# Patient Record
Sex: FEMALE | Race: WHITE | NOT HISPANIC OR LATINO | Employment: FULL TIME | URBAN - METROPOLITAN AREA
[De-identification: names, ages, dates, MRNs, and addresses within clinical notes are randomized per-mention and may not be internally consistent; named-entity substitution may affect disease eponyms.]

---

## 2023-03-31 ENCOUNTER — TELEPHONE (OUTPATIENT)
Dept: PRIMARY CARE | Facility: CLINIC | Age: 28
End: 2023-03-31
Payer: COMMERCIAL

## 2023-03-31 DIAGNOSIS — R74.01 ELEVATED ALT MEASUREMENT: Primary | ICD-10-CM

## 2023-03-31 DIAGNOSIS — E55.9 VITAMIN D DEFICIENCY: ICD-10-CM

## 2023-03-31 LAB
ALANINE AMINOTRANSFERASE (SGPT) (U/L) IN SER/PLAS: 76 U/L (ref 7–45)
ALBUMIN (G/DL) IN SER/PLAS: 3.9 G/DL (ref 3.4–5)
ALKALINE PHOSPHATASE (U/L) IN SER/PLAS: 87 U/L (ref 33–110)
ANION GAP IN SER/PLAS: 13 MMOL/L (ref 10–20)
APPEARANCE, URINE: ABNORMAL
ASPARTATE AMINOTRANSFERASE (SGOT) (U/L) IN SER/PLAS: 38 U/L (ref 9–39)
BASOPHILS (10*3/UL) IN BLOOD BY AUTOMATED COUNT: 0.04 X10E9/L (ref 0–0.1)
BASOPHILS/100 LEUKOCYTES IN BLOOD BY AUTOMATED COUNT: 0.5 % (ref 0–2)
BILIRUBIN TOTAL (MG/DL) IN SER/PLAS: 0.3 MG/DL (ref 0–1.2)
BILIRUBIN, URINE: NEGATIVE
BLOOD, URINE: ABNORMAL
CALCIDIOL (25 OH VITAMIN D3) (NG/ML) IN SER/PLAS: 12 NG/ML
CALCIUM (MG/DL) IN SER/PLAS: 8.8 MG/DL (ref 8.6–10.3)
CARBON DIOXIDE, TOTAL (MMOL/L) IN SER/PLAS: 24 MMOL/L (ref 21–32)
CHLORIDE (MMOL/L) IN SER/PLAS: 104 MMOL/L (ref 98–107)
CHOLESTEROL (MG/DL) IN SER/PLAS: 217 MG/DL (ref 0–199)
CHOLESTEROL IN HDL (MG/DL) IN SER/PLAS: 47.2 MG/DL
CHOLESTEROL/HDL RATIO: 4.6
CHORIOGONADOTROPIN (MIU/ML) IN SER/PLAS: <2 MIU/ML
COLOR, URINE: YELLOW
CREATININE (MG/DL) IN SER/PLAS: 0.67 MG/DL (ref 0.5–1.05)
DEHYDROEPIANDROSTERONE SULFATE (DHEA-S) (UG/DL) IN SER/: 139 UG/DL (ref 65–395)
EOSINOPHILS (10*3/UL) IN BLOOD BY AUTOMATED COUNT: 0.31 X10E9/L (ref 0–0.7)
EOSINOPHILS/100 LEUKOCYTES IN BLOOD BY AUTOMATED COUNT: 4.2 % (ref 0–6)
ERYTHROCYTE DISTRIBUTION WIDTH (RATIO) BY AUTOMATED COUNT: 12 % (ref 11.5–14.5)
ERYTHROCYTE MEAN CORPUSCULAR HEMOGLOBIN CONCENTRATION (G/DL) BY AUTOMATED: 34.1 G/DL (ref 32–36)
ERYTHROCYTE MEAN CORPUSCULAR VOLUME (FL) BY AUTOMATED COUNT: 92 FL (ref 80–100)
ERYTHROCYTES (10*6/UL) IN BLOOD BY AUTOMATED COUNT: 4.63 X10E12/L (ref 4–5.2)
ESTIMATED AVERAGE GLUCOSE FOR HBA1C: 100 MG/DL
ESTRADIOL (PG/ML) IN SER/PLAS: 30 PG/ML
FOLLITROPIN (IU/L) IN SER/PLAS: 7.8 IU/L
GFR FEMALE: >90 ML/MIN/1.73M2
GLUCOSE (MG/DL) IN SER/PLAS: 85 MG/DL (ref 74–99)
GLUCOSE, URINE: NEGATIVE MG/DL
HEMATOCRIT (%) IN BLOOD BY AUTOMATED COUNT: 42.5 % (ref 36–46)
HEMOGLOBIN (G/DL) IN BLOOD: 14.5 G/DL (ref 12–16)
HEMOGLOBIN A1C/HEMOGLOBIN TOTAL IN BLOOD: 5.1 %
IMMATURE GRANULOCYTES/100 LEUKOCYTES IN BLOOD BY AUTOMATED COUNT: 0.1 % (ref 0–0.9)
INSULIN: 15 UIU/ML (ref 3–25)
KETONES, URINE: NEGATIVE MG/DL
LDL: 130 MG/DL (ref 0–99)
LEUKOCYTE ESTERASE, URINE: NEGATIVE
LEUKOCYTES (10*3/UL) IN BLOOD BY AUTOMATED COUNT: 7.3 X10E9/L (ref 4.4–11.3)
LUTEINIZING HORMONE (IU/ML) IN SER/PLAS: 5.2 IU/L
LYMPHOCYTES (10*3/UL) IN BLOOD BY AUTOMATED COUNT: 2.76 X10E9/L (ref 1.2–4.8)
LYMPHOCYTES/100 LEUKOCYTES IN BLOOD BY AUTOMATED COUNT: 37.7 % (ref 13–44)
MONOCYTES (10*3/UL) IN BLOOD BY AUTOMATED COUNT: 0.51 X10E9/L (ref 0.1–1)
MONOCYTES/100 LEUKOCYTES IN BLOOD BY AUTOMATED COUNT: 7 % (ref 2–10)
MUCUS, URINE: NORMAL /LPF
NEUTROPHILS (10*3/UL) IN BLOOD BY AUTOMATED COUNT: 3.7 X10E9/L (ref 1.2–7.7)
NEUTROPHILS/100 LEUKOCYTES IN BLOOD BY AUTOMATED COUNT: 50.5 % (ref 40–80)
NITRITE, URINE: NEGATIVE
NON HDL CHOLESTEROL: 170 MG/DL
PH, URINE: 5 (ref 5–8)
PLATELETS (10*3/UL) IN BLOOD AUTOMATED COUNT: 276 X10E9/L (ref 150–450)
POTASSIUM (MMOL/L) IN SER/PLAS: 3.9 MMOL/L (ref 3.5–5.3)
PROTEIN TOTAL: 6.7 G/DL (ref 6.4–8.2)
PROTEIN, URINE: NEGATIVE MG/DL
RBC, URINE: 2 /HPF (ref 0–5)
SODIUM (MMOL/L) IN SER/PLAS: 137 MMOL/L (ref 136–145)
SPECIFIC GRAVITY, URINE: 1.02 (ref 1–1.03)
SQUAMOUS EPITHELIAL CELLS, URINE: 3 /HPF
THYROPEROXIDASE AB (IU/ML) IN SER/PLAS: 29 IU/ML
THYROTROPIN (MIU/L) IN SER/PLAS BY DETECTION LIMIT <= 0.05 MIU/L: 2.51 MIU/L (ref 0.44–3.98)
THYROXINE (T4) FREE (NG/DL) IN SER/PLAS: 0.68 NG/DL (ref 0.61–1.12)
TRIGLYCERIDE (MG/DL) IN SER/PLAS: 201 MG/DL (ref 0–149)
TRIIODOTHYRONINE (T3) FREE (PG/ML) IN SER/PLAS: 3.1 PG/ML (ref 2.3–4.2)
UREA NITROGEN (MG/DL) IN SER/PLAS: 10 MG/DL (ref 6–23)
UROBILINOGEN, URINE: <2 MG/DL (ref 0–1.9)
VLDL: 40 MG/DL (ref 0–40)
WBC, URINE: 2 /HPF (ref 0–5)

## 2023-03-31 RX ORDER — ERGOCALCIFEROL 1.25 MG/1
50000 CAPSULE ORAL
Qty: 8 CAPSULE | Refills: 0 | Status: SHIPPED | OUTPATIENT
Start: 2023-03-31 | End: 2023-05-26

## 2023-03-31 NOTE — TELEPHONE ENCOUNTER
----- Message from SARAH Coleman-CNP sent at 3/31/2023  4:18 PM EDT -----    ----- Message -----  From: Eliazar Marie - Lab Results In  Sent: 3/31/2023  11:11 AM EDT  To: Edith Norton MD

## 2023-04-06 PROBLEM — E78.2 HYPERLIPIDEMIA, MIXED: Status: ACTIVE | Noted: 2023-04-06

## 2023-04-06 PROBLEM — E55.9 VITAMIN D DEFICIENCY: Status: ACTIVE | Noted: 2023-04-06

## 2023-04-06 PROBLEM — E66.09 CLASS 2 OBESITY DUE TO EXCESS CALORIES WITH BODY MASS INDEX (BMI) OF 37.0 TO 37.9 IN ADULT: Status: ACTIVE | Noted: 2023-04-06

## 2023-04-06 PROBLEM — F60.5 OBSESSIVE-COMPULSIVE PERSONALITY DISORDER (MULTI): Status: ACTIVE | Noted: 2023-04-06

## 2023-04-06 PROBLEM — E66.812 CLASS 2 OBESITY DUE TO EXCESS CALORIES WITH BODY MASS INDEX (BMI) OF 37.0 TO 37.9 IN ADULT: Status: ACTIVE | Noted: 2023-04-06

## 2023-04-06 PROBLEM — F41.9 ANXIETY: Status: ACTIVE | Noted: 2023-04-06

## 2023-04-06 PROBLEM — B36.0 TINEA VERSICOLOR: Status: ACTIVE | Noted: 2023-04-06

## 2023-04-06 PROBLEM — L70.9 ACNE, MILD: Status: ACTIVE | Noted: 2023-04-06

## 2023-04-06 LAB
17-HYDROXYPROGESTERONE (REFLAB): 67.61 NG/DL
TESTOSTERONE FREE (CHAN): 4.4 PG/ML (ref 0.1–6.4)
TESTOSTERONE,TOTAL,LC-MS/MS: 27 NG/DL (ref 2–45)

## 2023-04-11 ENCOUNTER — TELEPHONE (OUTPATIENT)
Dept: PRIMARY CARE | Facility: CLINIC | Age: 28
End: 2023-04-11
Payer: COMMERCIAL

## 2023-04-11 NOTE — TELEPHONE ENCOUNTER
Faxed lab results to :  Amada Sneed NP [facility]  [phone] (fax)  [phone] (phone)

## 2023-04-23 PROBLEM — F41.9 ANXIETY AND DEPRESSION: Status: ACTIVE | Noted: 2023-04-23

## 2023-04-23 PROBLEM — E66.01 CLASS 2 SEVERE OBESITY DUE TO EXCESS CALORIES WITH SERIOUS COMORBIDITY AND BODY MASS INDEX (BMI) OF 38.0 TO 38.9 IN ADULT (MULTI): Status: ACTIVE | Noted: 2023-04-06

## 2023-04-23 PROBLEM — F32.A ANXIETY AND DEPRESSION: Status: ACTIVE | Noted: 2023-04-23

## 2023-04-23 PROBLEM — Z00.00 ROUTINE ADULT HEALTH MAINTENANCE: Status: ACTIVE | Noted: 2023-04-23

## 2023-04-29 DIAGNOSIS — E66.09 CLASS 2 OBESITY DUE TO EXCESS CALORIES WITHOUT SERIOUS COMORBIDITY WITH BODY MASS INDEX (BMI) OF 37.0 TO 37.9 IN ADULT: ICD-10-CM

## 2023-04-30 RX ORDER — METFORMIN HYDROCHLORIDE 500 MG/1
500 TABLET, EXTENDED RELEASE ORAL
Qty: 30 TABLET | Refills: 11 | Status: SHIPPED | OUTPATIENT
Start: 2023-04-30 | End: 2023-05-19 | Stop reason: SINTOL

## 2023-05-18 DIAGNOSIS — K80.20 CALCULUS OF GALLBLADDER WITHOUT CHOLECYSTITIS WITHOUT OBSTRUCTION: ICD-10-CM

## 2023-05-18 DIAGNOSIS — K76.0 FATTY LIVER: Primary | ICD-10-CM

## 2023-05-19 ENCOUNTER — OFFICE VISIT (OUTPATIENT)
Dept: PRIMARY CARE | Facility: CLINIC | Age: 28
End: 2023-05-19
Payer: COMMERCIAL

## 2023-05-19 VITALS
WEIGHT: 228 LBS | BODY MASS INDEX: 37.99 KG/M2 | SYSTOLIC BLOOD PRESSURE: 120 MMHG | HEIGHT: 65 IN | HEART RATE: 88 BPM | DIASTOLIC BLOOD PRESSURE: 90 MMHG

## 2023-05-19 DIAGNOSIS — F60.5 OBSESSIVE-COMPULSIVE PERSONALITY DISORDER (MULTI): ICD-10-CM

## 2023-05-19 DIAGNOSIS — H61.23 CERUMEN DEBRIS ON TYMPANIC MEMBRANE OF BOTH EARS: ICD-10-CM

## 2023-05-19 DIAGNOSIS — E66.01 CLASS 2 SEVERE OBESITY DUE TO EXCESS CALORIES WITH SERIOUS COMORBIDITY AND BODY MASS INDEX (BMI) OF 38.0 TO 38.9 IN ADULT (MULTI): ICD-10-CM

## 2023-05-19 DIAGNOSIS — Z00.00 ROUTINE ADULT HEALTH MAINTENANCE: Primary | ICD-10-CM

## 2023-05-19 DIAGNOSIS — K76.0 FATTY LIVER: ICD-10-CM

## 2023-05-19 DIAGNOSIS — E55.9 VITAMIN D DEFICIENCY: ICD-10-CM

## 2023-05-19 PROBLEM — B36.0 TINEA VERSICOLOR: Status: RESOLVED | Noted: 2023-04-06 | Resolved: 2023-05-19

## 2023-05-19 PROCEDURE — 3008F BODY MASS INDEX DOCD: CPT | Performed by: INTERNAL MEDICINE

## 2023-05-19 PROCEDURE — 99395 PREV VISIT EST AGE 18-39: CPT | Performed by: INTERNAL MEDICINE

## 2023-05-19 PROCEDURE — 1036F TOBACCO NON-USER: CPT | Performed by: INTERNAL MEDICINE

## 2023-05-19 PROCEDURE — 69209 REMOVE IMPACTED EAR WAX UNI: CPT | Performed by: INTERNAL MEDICINE

## 2023-05-19 RX ORDER — DOXYCYCLINE HYCLATE 100 MG
100 TABLET ORAL DAILY
COMMUNITY
Start: 2021-09-16 | End: 2024-05-06 | Stop reason: ALTCHOICE

## 2023-05-19 RX ORDER — CITALOPRAM 40 MG/1
40 TABLET, FILM COATED ORAL DAILY
COMMUNITY
Start: 2021-05-03 | End: 2023-10-16

## 2023-05-19 RX ORDER — GLUCOSAM/CHONDRO/HERB 149/HYAL 750-100 MG
1 TABLET ORAL DAILY
COMMUNITY

## 2023-05-19 NOTE — PATIENT INSTRUCTIONS
Can call [facility]  [phone] (Or visit their website [email or web address])  They offer The GLP-1/GIP meds as cash pay $50/week (for Wegovy/Ozempic) or $75/week for Mounjaro

## 2023-05-19 NOTE — ASSESSMENT & PLAN NOTE
Annual Wellness exam completed   Preventive Health history reviewed:  Vaccines today: none  Declined Gc/Chlamydia testing  Labs ordered    Depression Screening done

## 2023-05-19 NOTE — PROGRESS NOTES
Reason for Visit: Annual Physical Exam    Assessment and Plan:  Problem List Items Addressed This Visit          High    Routine adult health maintenance - Primary    Overview     COVID-19 vaccine (MODERNA)3/31/2021, 3/3/2021, 12/7/21  DTaP, unspecified formulation8/1/2007, 4/26/2000, 7/26/1996  HIB - 3 Dose Schedule3/25/1996, 6/26/1995, 4/13/1995, ... (1 more)  HUMAN PAPILLOMAVIRUS QUADRIVALENT - Male and Zliuqic21/1/2007, 5/31/2007, 3/29/2007  Hepatitis A Peds/Adol1/20/2010, 7/20/2009  Hepatitis B Peds/Adol6/26/1995, 4/13/1995, 2/21/1995  IPV7/25/1996  Influenza Seasonal Inj Quad Age 6 Mo-64 Yrs Pres Free10/14/2017, 10/1/21  MMR5/25/2000, 3/25/1996  Meningococcal Conjugate MCV4P Vaccine, IM8/21/2013, 3/1/2006  Poliovirus, unspecified formulation7/26/1996, 6/26/1995, 4/13/1995, ... (1 more)  Tdap (Age 7+)8/1/2007  Td 4/21/22  PAP 5/7/21 (-)- Sees GYN  GC/CHlamydia 9/18/17 (-)         Current Assessment & Plan     Annual Wellness exam completed   Preventive Health history reviewed:  Vaccines today: none  Declined Gc/Chlamydia testing  Labs ordered    Depression Screening done            Medium    Class 2 severe obesity due to excess calories with serious comorbidity and body mass index (BMI) of 38.0 to 38.9 in adult (CMS/HCC)    Overview     Diet and Exercise  Does WW,   is On Celexa  Failed Metformin (diarrhea)         Current Assessment & Plan     Try IF  Try GLP1 med  Will have her see Endo         Relevant Medications    semaglutide, weight loss, (WEGOVY) 0.25 mg/0.5 mL pen injector    Other Relevant Orders    Referral to Endocrinology    Fatty liver    Overview     5/23: US abd:   Hepatic steatosis.  Cholelithiasis  5/23 Fib-4 Score: 3.42         Current Assessment & Plan     Will send for Fibroscan  Hepatology consult  Weight loss         Relevant Medications    semaglutide, weight loss, (WEGOVY) 0.25 mg/0.5 mL pen injector    Other Relevant Orders    ALT    AST    Referral to Gastroenterology     Obsessive-compulsive personality disorder (CMS/HCC)    Overview     On Celexa   Sees Ashley Hathaway;         Vitamin D deficiency    Overview     2007: 30  4/22: 15  4/23: 12, started 50K daily  Goal ~50         Relevant Orders    Vitamin D 25-Hydroxy,Total     Other Visit Diagnoses       Cerumen debris on tympanic membrane of both ears        Relevant Orders    Ear Cerumen Removal          HPI:stopped birth control this year   Getting bad diarrhea from the Metformin  US Liver: Hepatic steatosis.                  Cholelithiasis.  She is upset about her weight  She was referred to nutritionist and she said she shouldn't be gaining weight  Based o what she was eating    Labs reviewed:  Component      Latest Ref Rng 3/31/2023   WBC      4.4 - 11.3 x10E9/L 7.3    RBC      4.00 - 5.20 x10E12/L 4.63    HEMOGLOBIN      12.0 - 16.0 g/dL 14.5    HEMATOCRIT      36.0 - 46.0 % 42.5    MCV      80 - 100 fL 92    MCHC      32.0 - 36.0 g/dL 34.1    Platelets      150 - 450 x10E9/L 276    RED CELL DISTRIBUTION WIDTH      11.5 - 14.5 % 12.0    Neutrophils %      40.0 - 80.0 % 50.5    Immature Granulocytes %, Automated      0.0 - 0.9 % 0.1    Lymphocytes %      13.0 - 44.0 % 37.7    Monocytes %      2.0 - 10.0 % 7.0    Eosinophils %      0.0 - 6.0 % 4.2    Basophils %      0.0 - 2.0 % 0.5    Neutrophils Absolute      1.20 - 7.70 x10E9/L 3.70    Lymphocytes Absolute      1.20 - 4.80 x10E9/L 2.76    Monocytes Absolute      0.10 - 1.00 x10E9/L 0.51    Eosinophils Absolute      0.00 - 0.70 x10E9/L 0.31    Basophils Absolute      0.00 - 0.10 x10E9/L 0.04    GLUCOSE      74 - 99 mg/dL 85    SODIUM      136 - 145 mmol/L 137    POTASSIUM      3.5 - 5.3 mmol/L 3.9    CHLORIDE      98 - 107 mmol/L 104    Bicarbonate      21 - 32 mmol/L 24    Anion Gap      10 - 20 mmol/L 13    Blood Urea Nitrogen      6 - 23 mg/dL 10    Creatinine      0.50 - 1.05 mg/dL 0.67    GFR Female      >90 mL/min/1.73m2 >90    Calcium      8.6 - 10.3 mg/dL 8.8     Albumin      3.4 - 5.0 g/dL 3.9    Alkaline Phosphatase      33 - 110 U/L 87    Total Protein      6.4 - 8.2 g/dL 6.7    AST      9 - 39 U/L 38    Bilirubin Total      0.0 - 1.2 mg/dL 0.3    ALT      7 - 45 U/L 76 (H)    Color, Urine      STRAW,YELLOW  YELLOW    Appearance, Urine      CLEAR  HAZY    Specific Gravity, Urine      1.005 - 1.035  1.021    pH, Urine      5.0 - 8.0  5.0    Protein, Urine      NEGATIVE mg/dL NEGATIVE    Glucose, Urine      NEGATIVE mg/dL NEGATIVE    Blood, Urine      NEGATIVE  MODERATE(2+) !    Ketones, Urine      NEGATIVE mg/dL NEGATIVE    Bilirubin, Urine      NEGATIVE  NEGATIVE    Urobilinogen, Urine      0.0 - 1.9 mg/dL <2.0    Nitrite, Urine      NEGATIVE  NEGATIVE    Leukocyte Esterase, Urine      NEGATIVE  NEGATIVE    CHOLESTEROL      0 - 199 mg/dL 217 (H)    HDL CHOLESTEROL      mg/dL 47.2    Cholesterol/HDL Ratio 4.6    LDL      0 - 99 mg/dL 130 (H)    VLDL      0 - 40 mg/dL 40    TRIGLYCERIDES      0 - 149 mg/dL 201 (H)    Non HDL Cholesterol      mg/dL 170    WBC, Urine      0 - 5 /HPF 2    RBC, Urine      0 - 5 /HPF 2    Squamous Epithelial Cells, Urine      /HPF 3    Mucus, Urine      /LPF 3+    FOLLICLE STIMULATING HORMONE      IU/L 7.8    LH      IU/L 5.2    Hemoglobin A1C      % 5.1    Estimated Average Glucose      MG/    Testosterone, Total, LC-MS/MS      2 - 45 ng/dL 27    Testosterone, Free      0.1 - 6.4 pg/mL 4.4    Insulin      3 - 25 uIU/mL 15    DHEA Sulfate      65 - 395 ug/dL 139    Thyroid Stimulating Hormone      0.44 - 3.98 mIU/L 2.51    Vitamin D, 25-Hydroxy, Total      ng/mL 12 !    Triiodothyronine, Free      2.3 - 4.2 pg/mL 3.1    Thyroid Peroxidase (TPO) Antibody      IU/mL 29    Thyroxine, Free      0.61 - 1.12 ng/dL 0.68    Estradiol      pg/mL 30    HCG, Beta-Quantitative      mIU/mL <2    17-Hydroxyprogesterone      <=206.00 ng/dL 67.61           ROS otherwise negative aside from what was mentioned above in HPI.    Vitals  /90   Pulse  "88   Ht 1.638 m (5' 4.5\")   Wt 103 kg (228 lb)   BMI 38.53 kg/m²   Body mass index is 38.53 kg/m².  Physical Exam  Gen: Alert, NAD  HEENT:  Normal exam except bilatera cerumen   Neck:  No masses/nodes palpable; Thyroid nontender and without nodules; No YON  Respiratory:  Lungs CTAB  CV: RRR  Abd soft, NT  Neuro:  Gross motor and sensory intact  Skin:  acne lesions scattered on torso  Breast: No masses or axillary lymphadenopathy    Patient ID: Arianna Suarez is a 27 y.o. female.    Ear Cerumen Removal    Date/Time: 5/19/2023 4:23 PM    Performed by: Edith Norton MD  Authorized by: Edith Norton MD    Consent:     Consent obtained:  Verbal    Consent given by:  Patient    Risks, benefits, and alternatives were discussed: yes    Universal protocol:     Patient identity confirmed:  Verbally with patient  Procedure details:     Location:  L ear and R ear    Procedure type: irrigation      Procedure outcomes: cerumen removed    Post-procedure details:     Procedure completion:  Tolerated well, no immediate complications      Active Problem List  Patient Active Problem List   Diagnosis    Acne, mild    Anxiety    Hyperlipidemia, mixed    Obsessive-compulsive personality disorder (CMS/HCC)    Vitamin D deficiency    Class 2 severe obesity due to excess calories with serious comorbidity and body mass index (BMI) of 38.0 to 38.9 in adult (CMS/HCC)    Routine adult health maintenance    Anxiety and depression    Hypertrophy of breast    Fatty liver    Cholelithiasis       Comprehensive Medical/Surgical/Social/Family History  Past Medical History:   Diagnosis Date    H/O diagnostic ultrasound 05/18/2023     abd: Hepatic steatosis.   Cholelithiasis     History reviewed. No pertinent surgical history.  Social History     Social History Narrative    Single, no kids    Works at [employer]    Nonsmoker    Rarely ETOH    ---    Family History:    M: HTN    F: Skin CA    S: Sada. Fibroadenoma of breast, Fatty Liver, " Skin CA    MGM: Breast CA, CVA    MGF: Prostate CA, Thyroid, HTN    PGF: CVA       Allergies and Medications  Metformin and Penicillins  Current Outpatient Medications   Medication Instructions    citalopram (CELEXA) 40 mg, oral, Daily    doxycycline (VIBRA-TABS) 100 mg, oral, Daily    ergocalciferol (VITAMIN D-2) 50,000 Units, oral, Weekly    omega 3-dha-epa-fish oil (Fish OiL) 1,000 mg (120 mg-180 mg) capsule 1 capsule, oral, Daily    semaglutide (weight loss) (WEGOVY) 0.25 mg, subcutaneous, Every 7 days

## 2023-06-05 ENCOUNTER — PATIENT MESSAGE (OUTPATIENT)
Dept: PRIMARY CARE | Facility: CLINIC | Age: 28
End: 2023-06-05
Payer: COMMERCIAL

## 2023-06-06 NOTE — TELEPHONE ENCOUNTER
From: Arianna Suarez  To: Edith Norton MD  Sent: 6/5/2023 3:21 PM EDT  Subject: Two questions     Hi,     Two things I wanted to see:   1. I have an appointment with Tejal Govea tomorrow. Will he have access to my ultra sound or can that be sent to him?     2. I got the alternative wegovy from the [facility]. I’m having it shipped to my house since it’s cheaper than going to their office once a week to have them do it. One thing is that it’s not like a pen application, it’s a vial and needles to fill and do myself and inject into my stomach. Do I have to be worried about any air bubbles or anything when I’m doing it myself? Or is this something I should come into you guys to inject for me and I bring the medicine?     Thank you,   Arianna

## 2023-06-06 NOTE — PATIENT COMMUNICATION
Patient returned call. Message relayed.  She will call back when she receives her Monjauro to schedule a nurse visit.

## 2023-06-06 NOTE — PATIENT COMMUNICATION
Left message for patient to contact the office. Results sent to [facility] [phone], confirmation received.

## 2023-06-15 ENCOUNTER — NUTRITION (OUTPATIENT)
Dept: PRIMARY CARE | Facility: CLINIC | Age: 28
End: 2023-06-15
Payer: COMMERCIAL

## 2023-06-15 DIAGNOSIS — Z71.3 DIETARY COUNSELING: Primary | ICD-10-CM

## 2023-06-15 NOTE — PROGRESS NOTES
"Assessment     Reason for Visit:  Arianna Suarez is a 27 y.o. female who presents for Nutrition Counseling (Liver MNT, general healthy eating MNT)    Anthropometrics:            Food And Nutrient Intake:  Food and Nutrient History  Food and Nutrient History: Pt is here with concerns around recent labs showing fatty liver disease. Has plans for biopsy in 1 month for further diagnosis and plan. She is trying to increase fruit/veg intake but struggles with textures of certain foods or flavors. Hasn't found many ways that she enjoys vegetables especially. Likes fruits more but also worries about sugar in fruit. Trying to eat 3 meals daily and avoid snacking. Has hx of chronic dieting and weight cycling as a result. Would calorie count for 1200 calories as early as high school (on and off over the years since then). Frustrated with reducing kcals presently and not losing weight like she used to. Will get lightheaded and dizzy if she goes too long without eating. Doesn't always feel hungry in the morning, stops eating around 6p at night and doesn't feel hungry afterwards.  Energy Intake: Fair 50-75 %  Fluid Intake: water and diet soda  GI Symptoms: diarrhea, abdominal pain (with gluten intake - has been mostly gluten free for 1 year)  GI Symptoms greater than 2 weeks: intermittent     Food Intake  Meal 1: Kind bar or egg muffin cups  Meal 2: gf tortilla with cheese (quesadilla) or dry cereal or leftover chicken and rice - quick, doesn't have time to make something to eat during the day  Meal 3: steak and potatoes, chicken and rice, used to eat more pizza, pasta, ice cream before fatty liver diagnosis/concern  Snacks: thin reeses at night but not many snacks now, some apple, strawberry, banana (avoiding 2/2 sugar worry now)  Food Variety: Absent (Pt wants to increase variety of plants in meals/snacks - \"same foods\" often but not always a bad thing for her - feels she does well eating similar meals often)    Food " "Preparation  Cooking: Patient, Family  Grocery Shopping: Patient                                                   Food And Nutrient Administration:       Diet Experience  Self-selected diet(s) followed: Did calorie counting in HS - 1200 kcals - on and off calorie counting since then. Tried WW and Optiva also. Realized most diets were not sustainable and is focusing more on behavior changes now. Would usually see 10-15# weight loss on a diet and then weight regain plus a few more pounds every time - weight cycling 2/2 dieting and increased set point weight here                Factors:                         Physical Activities:              Knowledge Beliefs Attitudes and Behavior       Beliefs and Attitudes  Beliefs and Attitudes: Readiness to change nutrition-related behaviors, Motivation, Food preferences (SOC: contemplative)                   Mealtime Behavior  Rigid Sensory Preferences: Texture, Flavor, Other (comment) (Doesn't like when she is eating a texture of food, and a different texture shows up - prefers that food has the same texture throughout. If eating something and the taste or texture becomes off-putting, can gag/get reflux, unable to finish food)           Nutrition Focused Physical Exam:           Energy Needs           Diagnosis      Nutrition Diagnosis  Patient has Nutrition Diagnosis: Yes  Nutrition Diagnosis 1: Inadequate fiber intake  Related to (1): food preferences  As Evidenced by (1): lower fruit/veg intake than patient would like    Interventions/Recommendations   Nutrition Education  Nutrition Education Content: Content related nutrition education  Goals: Discussed omega-3 for TGs, antioxidant intake for liver health, increasing fruit/veg intake - whether that's \"mediterranean\" or just eating more plants overall is still helpful. Discussed talking to GI doctor about celiac testing considering very low vitamin D and NAFLD - discussed common correlation between undiagnosed celiac and " NAFLD 2/2 villi atrophy and malabsorption. Discussed follow up w/ team regarding PCOS diagnosis - will address MNT for this if diagnosis is confirmed.  Nutrition Counseling  Strategies: Nutrition counseling based on motivational interviewing strategy, Nutrition counseling based on goal setting strategy, Nutrition counseling based on problem solving strategy  Goals: Explored pts goals for increasing plant intake, meal planning especially for lunch, navigating her food preferences with health goals. Discussed non-diet approach - pt agrees that dieting has not been sustainable, as research confirms and agrees that using MI to work on behavior changes is her goal. Discussed that changing habits takes time, trial and error. Explored ideas for plate visual - aiming to eat more variety and colors at meals: breakfast - aiming to have a few food groups if possible, adding a smoothie to lunch for increasing plant intake and keeping dinner as is - protein, starch and veg. Will notice what meals/textures/vegs work and what doesnt - will problem solve together for this in next session.        There are no Patient Instructions on file for this visit.    Monitoring and Evaluation   Food/Nutrient Related History Monitoring  Monitoring and Evaluation Plan: Amount of food, Meal/snack pattern, Fiber intake  Meal/Snack Pattern: Food variety  Criteria: More focus on plant intake of any kind  Knowledge/Beliefs/Attitudes  Monitoring and Evaluation Plan: Food and nutrition knowledge  Biochemical Data, Medical Tests and Procedures  Monitoring and Evaluation Plan: GI profile, Glucose/endocrine profile, Lipid profile, Vitamin profile        Time Spent  Prep time on day of patient encounter: 10 minutes  Time spent directly with patient, family or caregiver: 50 minutes  Additional Time Spent on Patient Care Activities: 0 minutes  Documentation Time: 15 minutes  Other Time Spent: 0 minutes  Total: 75 minutes

## 2023-06-21 ENCOUNTER — TELEPHONE (OUTPATIENT)
Dept: PRIMARY CARE | Facility: CLINIC | Age: 28
End: 2023-06-21
Payer: COMMERCIAL

## 2023-06-21 NOTE — TELEPHONE ENCOUNTER
Records request received from [facility]. Faxed to [facility] [phone]. Confirmation received. For status updates, call [phone] Option 1.

## 2023-06-28 ENCOUNTER — LAB (OUTPATIENT)
Dept: LAB | Facility: LAB | Age: 28
End: 2023-06-28
Payer: COMMERCIAL

## 2023-06-28 ENCOUNTER — OFFICE VISIT (OUTPATIENT)
Dept: PRIMARY CARE | Facility: CLINIC | Age: 28
End: 2023-06-28
Payer: COMMERCIAL

## 2023-06-28 ENCOUNTER — TELEPHONE (OUTPATIENT)
Dept: PRIMARY CARE | Facility: CLINIC | Age: 28
End: 2023-06-28

## 2023-06-28 VITALS
OXYGEN SATURATION: 98 % | TEMPERATURE: 97.2 F | DIASTOLIC BLOOD PRESSURE: 77 MMHG | HEART RATE: 96 BPM | SYSTOLIC BLOOD PRESSURE: 109 MMHG | BODY MASS INDEX: 37.86 KG/M2 | WEIGHT: 224 LBS

## 2023-06-28 DIAGNOSIS — Z11.3 SCREENING FOR STD (SEXUALLY TRANSMITTED DISEASE): ICD-10-CM

## 2023-06-28 DIAGNOSIS — R50.9 FEVER, UNSPECIFIED FEVER CAUSE: Primary | ICD-10-CM

## 2023-06-28 DIAGNOSIS — R10.9 ABDOMINAL DISCOMFORT: ICD-10-CM

## 2023-06-28 DIAGNOSIS — R50.9 FEVER, UNSPECIFIED FEVER CAUSE: ICD-10-CM

## 2023-06-28 LAB
AMORPHOUS CRYSTALS, URINE: ABNORMAL /HPF
APPEARANCE, URINE: ABNORMAL
BILIRUBIN, URINE: NEGATIVE
BLOOD, URINE: ABNORMAL
COLOR, URINE: YELLOW
GLUCOSE, URINE: NEGATIVE MG/DL
KETONES, URINE: NEGATIVE MG/DL
LEUKOCYTE ESTERASE, URINE: NEGATIVE
NITRITE, URINE: NEGATIVE
PH, URINE: 5 (ref 5–8)
POC RAPID STREP: NEGATIVE
PROTEIN, URINE: NEGATIVE MG/DL
RBC, URINE: ABNORMAL /HPF (ref 0–5)
SPECIFIC GRAVITY, URINE: 1.03 (ref 1–1.03)
UROBILINOGEN, URINE: <2 MG/DL (ref 0–1.9)
WBC, URINE: ABNORMAL /HPF (ref 0–5)

## 2023-06-28 PROCEDURE — 1036F TOBACCO NON-USER: CPT | Performed by: NURSE PRACTITIONER

## 2023-06-28 PROCEDURE — 87389 HIV-1 AG W/HIV-1&-2 AB AG IA: CPT

## 2023-06-28 PROCEDURE — 87635 SARS-COV-2 COVID-19 AMP PRB: CPT

## 2023-06-28 PROCEDURE — 87880 STREP A ASSAY W/OPTIC: CPT | Performed by: NURSE PRACTITIONER

## 2023-06-28 PROCEDURE — 99213 OFFICE O/P EST LOW 20 MIN: CPT | Performed by: NURSE PRACTITIONER

## 2023-06-28 PROCEDURE — 36415 COLL VENOUS BLD VENIPUNCTURE: CPT

## 2023-06-28 PROCEDURE — 3008F BODY MASS INDEX DOCD: CPT | Performed by: NURSE PRACTITIONER

## 2023-06-28 PROCEDURE — 81001 URINALYSIS AUTO W/SCOPE: CPT

## 2023-06-28 RX ORDER — CHOLECALCIFEROL (VITAMIN D3) 125 MCG
125 CAPSULE ORAL
COMMUNITY
Start: 2022-04-22 | End: 2024-03-18 | Stop reason: WASHOUT

## 2023-06-28 ASSESSMENT — ENCOUNTER SYMPTOMS: FEVER: 1

## 2023-06-28 ASSESSMENT — PATIENT HEALTH QUESTIONNAIRE - PHQ9
1. LITTLE INTEREST OR PLEASURE IN DOING THINGS: NOT AT ALL
2. FEELING DOWN, DEPRESSED OR HOPELESS: NOT AT ALL
SUM OF ALL RESPONSES TO PHQ9 QUESTIONS 1 AND 2: 0

## 2023-06-28 NOTE — TELEPHONE ENCOUNTER
Regarding: Fever   Contact: [phone]  ----- Message from Edith Norton MD sent at 6/28/2023  6:29 AM EDT -----  Schedule appt with NP today  Do not think her face touching his body is a risk for HIV transmission, wil if no broken skin or exposure to bodily fluids  Can always do an HIV test to allay her fears, and another one in ~6months to confirm negative   More likely this may be COVID or another viral infection  Would do home test if has one     ----- Message from Arianna Suarez to Edith Norton MD sent at 6/27/2023  7:15 PM -----   Hi,     I just got back from [place] for a [employer] party on Sunday and this afternoon I started to get the chills and have a 101.7 fever. Is there anything I should do for this?     I didn’t know if it could be sun poisoning because I was in the sun for 2 days and my back got burnt.     And my hypochondriac self.. we played this game and my friends pringle friend put a cucumber between his legs with a speedo on and I had to grab it with my mouth and my face touched his private parts. So my googling went to HIV.. is that possible to get with that game? Should I be concerned about that?     Thank you,   Arianna

## 2023-06-28 NOTE — PROGRESS NOTES
"Assessment/Plan   Problem List Items Addressed This Visit    None  Visit Diagnoses       Fever, unspecified fever cause    -  Primary    has been afebrile for > 12 hours  did send covid given recent [place] travel  IO rapid strep neg  conservative mgt    Relevant Orders    POCT Rapid Strep A manually resulted (Completed)    Sars-CoV-2 PCR, Symptomatic    Urinalysis with Reflex Microscopic    Screening for STD (sexually transmitted disease)        she requests HIV testing    Relevant Orders    HIV 1/2 Antigen/Antibody Screen with Reflex to Confirmation    Abdominal discomfort        over bladder  check urine            Subjective   Patient ID: Arianna Suarez is a 27 y.o. female who presents for Fever (Fever last night took motrin fever subsided/Mid abdominal pain last night better today).  Fever       Afebrile now  Motrin last at midnight last night  No sore throat  No ear pain  Slight low mid abd pain  - better today  No hematuria  No urgency or frequency  No dysuria    Would like HIV testing  Was at party over weekend  She is not sexually active       Review of Systems   Constitutional:  Positive for fever.   All other systems reviewed and are negative.      BP Readings from Last 3 Encounters:   06/28/23 109/77   05/19/23 120/90   08/10/22 108/76      Wt Readings from Last 3 Encounters:   06/28/23 102 kg (224 lb)   05/19/23 103 kg (228 lb)   02/06/23 102 kg (225 lb)      BMI:   Estimated body mass index is 37.86 kg/m² as calculated from the following:    Height as of 5/19/23: 1.638 m (5' 4.5\").    Weight as of this encounter: 102 kg (224 lb).    Objective   Physical Exam  Constitutional:       General: She is not in acute distress.  HENT:      Head: Normocephalic and atraumatic.      Right Ear: Tympanic membrane and external ear normal.      Left Ear: Tympanic membrane and external ear normal.      Nose: Nose normal.      Mouth/Throat:      Mouth: Mucous membranes are moist.   Eyes:      Extraocular Movements: " Extraocular movements intact.      Conjunctiva/sclera: Conjunctivae normal.   Cardiovascular:      Rate and Rhythm: Normal rate and regular rhythm.      Pulses: Normal pulses.   Pulmonary:      Effort: Pulmonary effort is normal.      Breath sounds: Normal breath sounds.   Abdominal:      General: Bowel sounds are normal.      Palpations: Abdomen is soft.      Comments: Tender over bladder    Musculoskeletal:         General: Normal range of motion.      Cervical back: Normal range of motion and neck supple.   Skin:     General: Skin is warm and dry.   Neurological:      General: No focal deficit present.      Mental Status: She is alert.   Psychiatric:         Mood and Affect: Mood normal.

## 2023-06-29 ENCOUNTER — APPOINTMENT (OUTPATIENT)
Dept: PRIMARY CARE | Facility: CLINIC | Age: 28
End: 2023-06-29
Payer: COMMERCIAL

## 2023-06-29 LAB
HIV 1/ 2 AG/AB SCREEN: NONREACTIVE
SARS-COV-2 RESULT: NOT DETECTED

## 2023-07-03 DIAGNOSIS — J02.9 PHARYNGITIS, UNSPECIFIED ETIOLOGY: Primary | ICD-10-CM

## 2023-07-03 RX ORDER — AZITHROMYCIN 250 MG/1
TABLET, FILM COATED ORAL
Qty: 6 TABLET | Refills: 0 | Status: SHIPPED | OUTPATIENT
Start: 2023-07-03 | End: 2023-07-08

## 2023-07-14 ENCOUNTER — TELEPHONE (OUTPATIENT)
Dept: PRIMARY CARE | Facility: CLINIC | Age: 28
End: 2023-07-14
Payer: COMMERCIAL

## 2023-07-14 NOTE — TELEPHONE ENCOUNTER
Records request recieved from [facility]. Faxed to [facility] [phone]. Confirmation received. For status updates, call [phone] Option 1.

## 2023-09-14 LAB
CORTISOL (UG/DL) IN SERUM - AM: 1.1 UG/DL (ref 5–20)
PROLACTIN (UG/L) IN SER/PLAS: 11.8 UG/L (ref 3–20)

## 2023-10-11 ENCOUNTER — APPOINTMENT (OUTPATIENT)
Dept: PRIMARY CARE | Facility: CLINIC | Age: 28
End: 2023-10-11
Payer: COMMERCIAL

## 2023-12-19 ENCOUNTER — APPOINTMENT (OUTPATIENT)
Dept: INTEGRATIVE MEDICINE | Facility: CLINIC | Age: 28
End: 2023-12-19
Payer: COMMERCIAL

## 2024-03-18 ENCOUNTER — OFFICE VISIT (OUTPATIENT)
Dept: PRIMARY CARE | Facility: CLINIC | Age: 29
End: 2024-03-18
Payer: COMMERCIAL

## 2024-03-18 ENCOUNTER — LAB (OUTPATIENT)
Dept: LAB | Facility: LAB | Age: 29
End: 2024-03-18
Payer: COMMERCIAL

## 2024-03-18 VITALS
HEART RATE: 83 BPM | BODY MASS INDEX: 37.39 KG/M2 | OXYGEN SATURATION: 97 % | SYSTOLIC BLOOD PRESSURE: 119 MMHG | WEIGHT: 217.8 LBS | DIASTOLIC BLOOD PRESSURE: 88 MMHG | TEMPERATURE: 97.5 F

## 2024-03-18 DIAGNOSIS — E78.2 HYPERLIPIDEMIA, MIXED: ICD-10-CM

## 2024-03-18 DIAGNOSIS — F41.9 ANXIETY AND DEPRESSION: ICD-10-CM

## 2024-03-18 DIAGNOSIS — F32.A ANXIETY AND DEPRESSION: ICD-10-CM

## 2024-03-18 DIAGNOSIS — K76.0 FATTY LIVER: ICD-10-CM

## 2024-03-18 DIAGNOSIS — E55.9 VITAMIN D DEFICIENCY: ICD-10-CM

## 2024-03-18 DIAGNOSIS — R68.89 FREQUENTLY SICK: ICD-10-CM

## 2024-03-18 DIAGNOSIS — Z00.00 ROUTINE ADULT HEALTH MAINTENANCE: ICD-10-CM

## 2024-03-18 DIAGNOSIS — R74.01 ELEVATED ALT MEASUREMENT: ICD-10-CM

## 2024-03-18 DIAGNOSIS — R68.89 FREQUENTLY SICK: Primary | ICD-10-CM

## 2024-03-18 LAB
25(OH)D3 SERPL-MCNC: 12 NG/ML (ref 30–100)
ALBUMIN SERPL BCP-MCNC: 4.1 G/DL (ref 3.4–5)
ALP SERPL-CCNC: 97 U/L (ref 33–110)
ALT SERPL W P-5'-P-CCNC: 59 U/L (ref 7–45)
ANION GAP SERPL CALC-SCNC: 11 MMOL/L (ref 10–20)
AST SERPL W P-5'-P-CCNC: 36 U/L (ref 9–39)
BASOPHILS # BLD AUTO: 0.03 X10*3/UL (ref 0–0.1)
BASOPHILS NFR BLD AUTO: 0.3 %
BILIRUB DIRECT SERPL-MCNC: 0.1 MG/DL (ref 0–0.3)
BILIRUB SERPL-MCNC: 0.8 MG/DL (ref 0–1.2)
BUN SERPL-MCNC: 9 MG/DL (ref 6–23)
CALCIUM SERPL-MCNC: 9 MG/DL (ref 8.6–10.3)
CHLORIDE SERPL-SCNC: 107 MMOL/L (ref 98–107)
CHOLEST SERPL-MCNC: 240 MG/DL (ref 0–199)
CHOLESTEROL/HDL RATIO: 4.9
CO2 SERPL-SCNC: 27 MMOL/L (ref 21–32)
CREAT SERPL-MCNC: 0.69 MG/DL (ref 0.5–1.05)
EGFRCR SERPLBLD CKD-EPI 2021: >90 ML/MIN/1.73M*2
EOSINOPHIL # BLD AUTO: 0.17 X10*3/UL (ref 0–0.7)
EOSINOPHIL NFR BLD AUTO: 1.7 %
ERYTHROCYTE [DISTWIDTH] IN BLOOD BY AUTOMATED COUNT: 12.7 % (ref 11.5–14.5)
GLIADIN PEPTIDE IGA SER IA-ACNC: <1 U/ML
GLUCOSE SERPL-MCNC: 99 MG/DL (ref 74–99)
HCT VFR BLD AUTO: 44.6 % (ref 36–46)
HDLC SERPL-MCNC: 48.5 MG/DL
HGB BLD-MCNC: 14.5 G/DL (ref 12–16)
IGA SERPL-MCNC: 132 MG/DL (ref 70–400)
IGE SERPL-ACNC: 24 IU/ML (ref 0–214)
IGG SERPL-MCNC: 1050 MG/DL (ref 700–1600)
IGM SERPL-MCNC: 57 MG/DL (ref 40–230)
IMM GRANULOCYTES # BLD AUTO: 0.02 X10*3/UL (ref 0–0.7)
IMM GRANULOCYTES NFR BLD AUTO: 0.2 % (ref 0–0.9)
LDLC SERPL CALC-MCNC: 154 MG/DL
LYMPHOCYTES # BLD AUTO: 1.67 X10*3/UL (ref 1.2–4.8)
LYMPHOCYTES NFR BLD AUTO: 16.4 %
MCH RBC QN AUTO: 30.5 PG (ref 26–34)
MCHC RBC AUTO-ENTMCNC: 32.5 G/DL (ref 32–36)
MCV RBC AUTO: 94 FL (ref 80–100)
MONOCYTES # BLD AUTO: 0.5 X10*3/UL (ref 0.1–1)
MONOCYTES NFR BLD AUTO: 4.9 %
NEUTROPHILS # BLD AUTO: 7.82 X10*3/UL (ref 1.2–7.7)
NEUTROPHILS NFR BLD AUTO: 76.5 %
NON HDL CHOLESTEROL: 192 MG/DL (ref 0–149)
NRBC BLD-RTO: 0 /100 WBCS (ref 0–0)
PLATELET # BLD AUTO: 311 X10*3/UL (ref 150–450)
POTASSIUM SERPL-SCNC: 4 MMOL/L (ref 3.5–5.3)
PROT SERPL-MCNC: 6.8 G/DL (ref 6.4–8.2)
RBC # BLD AUTO: 4.75 X10*6/UL (ref 4–5.2)
SODIUM SERPL-SCNC: 141 MMOL/L (ref 136–145)
TRIGL SERPL-MCNC: 189 MG/DL (ref 0–149)
TSH SERPL-ACNC: 1.04 MIU/L (ref 0.44–3.98)
TTG IGA SER IA-ACNC: <1 U/ML
VLDL: 38 MG/DL (ref 0–40)
WBC # BLD AUTO: 10.2 X10*3/UL (ref 4.4–11.3)

## 2024-03-18 PROCEDURE — 83516 IMMUNOASSAY NONANTIBODY: CPT

## 2024-03-18 PROCEDURE — 36415 COLL VENOUS BLD VENIPUNCTURE: CPT

## 2024-03-18 PROCEDURE — 3008F BODY MASS INDEX DOCD: CPT | Performed by: NURSE PRACTITIONER

## 2024-03-18 PROCEDURE — 80053 COMPREHEN METABOLIC PANEL: CPT

## 2024-03-18 PROCEDURE — 1036F TOBACCO NON-USER: CPT | Performed by: NURSE PRACTITIONER

## 2024-03-18 PROCEDURE — 85025 COMPLETE CBC W/AUTO DIFF WBC: CPT

## 2024-03-18 PROCEDURE — 82784 ASSAY IGA/IGD/IGG/IGM EACH: CPT

## 2024-03-18 PROCEDURE — 86003 ALLG SPEC IGE CRUDE XTRC EA: CPT

## 2024-03-18 PROCEDURE — 80061 LIPID PANEL: CPT

## 2024-03-18 PROCEDURE — 82248 BILIRUBIN DIRECT: CPT

## 2024-03-18 PROCEDURE — 82785 ASSAY OF IGE: CPT

## 2024-03-18 PROCEDURE — 84443 ASSAY THYROID STIM HORMONE: CPT

## 2024-03-18 PROCEDURE — 86038 ANTINUCLEAR ANTIBODIES: CPT

## 2024-03-18 PROCEDURE — 99213 OFFICE O/P EST LOW 20 MIN: CPT | Performed by: NURSE PRACTITIONER

## 2024-03-18 PROCEDURE — 82306 VITAMIN D 25 HYDROXY: CPT

## 2024-03-18 RX ORDER — FLUTICASONE PROPIONATE 50 MCG
SPRAY, SUSPENSION (ML) NASAL EVERY 12 HOURS
COMMUNITY
Start: 2022-09-22 | End: 2024-05-06 | Stop reason: ALTCHOICE

## 2024-03-18 RX ORDER — KETOCONAZOLE 20 MG/ML
SHAMPOO, SUSPENSION TOPICAL
COMMUNITY
Start: 2023-04-07 | End: 2024-05-06 | Stop reason: ALTCHOICE

## 2024-03-18 ASSESSMENT — PATIENT HEALTH QUESTIONNAIRE - PHQ9
SUM OF ALL RESPONSES TO PHQ9 QUESTIONS 1 AND 2: 0
1. LITTLE INTEREST OR PLEASURE IN DOING THINGS: NOT AT ALL
2. FEELING DOWN, DEPRESSED OR HOPELESS: NOT AT ALL

## 2024-03-18 NOTE — ASSESSMENT & PLAN NOTE
Feels she gets sick frequently  Most recent Illness occurred end of Feb  Had azith and steroid, UC stated it was uncontrolled allergies  Feeling better but still coughing  Discussed post viral cough syndrome  Discussed allergy and immunodeficiencies  Will get lab work  Discussed using flonase and zyrtec, can be helpful for allergies and post viral cough  Allergy and Immunology referral

## 2024-03-18 NOTE — PROGRESS NOTES
Subjective   Patient ID: Arianna Suarez is a 28 y.o. female who presents for Sick Visit.    2/22/24- congestion & cough started  Went to urgent care in march - said chronic allergy  Uri , dbl ear infection, laryngitis   Was given antibiotic and steroid  Flonase an allegra - advised to take but hasn't taken yet wanted to consult with pcp first.   Kinked neck from coughing - causes shooting pain up neck   Started feeling fatigue again today  Otherwise fine        Review of Systems  ROS completely negative except what was mentioned in the HPI.  Problem List, surgical, social, and family histories which were reviewed and updated as necessary within the EMR. I also personally reviewed the notes, labs, and imaging that pertained to what was documented or discussed in the HPI.    Objective   Physical Exam  Vitals and nursing note reviewed.   Constitutional:       General: She is not in acute distress.     Appearance: Normal appearance.   HENT:      Head: Normocephalic and atraumatic.      Right Ear: Tympanic membrane, ear canal and external ear normal.      Left Ear: Tympanic membrane, ear canal and external ear normal.      Ears:      Comments: Bilateral clear effusion     Nose: Nose normal.      Mouth/Throat:      Mouth: Mucous membranes are moist.   Eyes:      Extraocular Movements: Extraocular movements intact.      Conjunctiva/sclera: Conjunctivae normal.      Pupils: Pupils are equal, round, and reactive to light.   Cardiovascular:      Rate and Rhythm: Normal rate and regular rhythm.      Heart sounds: Normal heart sounds.   Pulmonary:      Effort: Pulmonary effort is normal.      Breath sounds: Normal breath sounds.      Comments: Dry cough  Musculoskeletal:         General: Normal range of motion.      Cervical back: Normal range of motion and neck supple.   Skin:     General: Skin is warm and dry.   Neurological:      General: No focal deficit present.      Mental Status: She is alert and oriented to person,  place, and time. Mental status is at baseline.   Psychiatric:         Mood and Affect: Mood normal.         Behavior: Behavior normal.         Thought Content: Thought content normal.         Judgment: Judgment normal.         /88   Pulse 83   Temp 36.4 °C (97.5 °F) (Temporal)   Wt 98.8 kg (217 lb 12.8 oz)   SpO2 97%   BMI 37.39 kg/m²     Assessment/Plan    Problem List Items Addressed This Visit       Anxiety and depression    Overview     Since childhood On Celexa See therapist also;         Relevant Orders    Vitamin D 25-Hydroxy,Total (for eval of Vitamin D levels)    TSH with reflex to Free T4 if abnormal    Comprehensive Metabolic Panel    CBC and Auto Differential    Fatty liver    Overview     5/23: US abd: Hepatic steatosis.  Cholelithiasis  5/23 Fib-4 Score: 3.42  S/o GI consult  Fibroscan 6/23: , kPa 15         Relevant Orders    Lipid Panel    TSH with reflex to Free T4 if abnormal    Comprehensive Metabolic Panel    Frequently sick - Primary    Current Assessment & Plan     Feels she gets sick frequently  Most recent Illness occurred end of Feb  Had azith and steroid, UC stated it was uncontrolled allergies  Feeling better but still coughing  Discussed post viral cough syndrome  Discussed allergy and immunodeficiencies  Will get lab work  Discussed using flonase and zyrtec, can be helpful for allergies and post viral cough  Allergy and Immunology referral         Relevant Orders    Referral to Allergy    Respiratory Allergy Profile IgE    Food Allergy Profile IgE    Celiac Panel    PATRICIA with Reflex to ASHLEIGH    Immunoglobulins (IgG, IgA, IgM)    Immunoglobulin IgE    Hyperlipidemia, mixed    Relevant Orders    Lipid Panel    TSH with reflex to Free T4 if abnormal    Comprehensive Metabolic Panel    Routine adult health maintenance    Overview     COVID-19 vaccine (MODERNA)3/31/2021, 3/3/2021, 12/7/21  DTaP, unspecified formulation8/1/2007, 4/26/2000, 7/26/1996  HIB - 3 Dose  Schedule3/25/1996, 6/26/1995, 4/13/1995, ... (1 more)  HUMAN PAPILLOMAVIRUS QUADRIVALENT - Male and Uegygww09/1/2007, 5/31/2007, 3/29/2007  Hepatitis A Peds/Adol1/20/2010, 7/20/2009  Hepatitis B Peds/Adol6/26/1995, 4/13/1995, 2/21/1995  IPV7/25/1996  Influenza Seasonal Inj Quad Age 6 Mo-64 Yrs Pres Free10/14/2017, 10/1/21  MMR5/25/2000, 3/25/1996  Meningococcal Conjugate MCV4P Vaccine, IM8/21/2013, 3/1/2006  Poliovirus, unspecified formulation7/26/1996, 6/26/1995, 4/13/1995, ... (1 more)  Tdap (Age 7+)8/1/2007  Td 4/21/22  PAP 5/7/21 (-)- Sees GYN  GC/CHlamydia 9/18/17 (-)         Relevant Orders    Lipid Panel    Vitamin D 25-Hydroxy,Total (for eval of Vitamin D levels)    TSH with reflex to Free T4 if abnormal    Comprehensive Metabolic Panel    CBC and Auto Differential    Vitamin D deficiency    Overview     2007: 30  4/22: 15  4/23: 12, started 50K daily  Goal ~50         Relevant Orders    Vitamin D 25-Hydroxy,Total (for eval of Vitamin D levels)

## 2024-03-19 ENCOUNTER — TELEPHONE (OUTPATIENT)
Dept: PRIMARY CARE | Facility: CLINIC | Age: 29
End: 2024-03-19
Payer: COMMERCIAL

## 2024-03-19 DIAGNOSIS — R05.1 ACUTE COUGH: Primary | ICD-10-CM

## 2024-03-19 DIAGNOSIS — E55.9 VITAMIN D DEFICIENCY: ICD-10-CM

## 2024-03-19 LAB
A ALTERNATA IGE QN: <0.1 KU/L
A FUMIGATUS IGE QN: <0.1 KU/L
ANA SER QL HEP2 SUBST: NEGATIVE
BERMUDA GRASS IGE QN: <0.1 KU/L
BOXELDER IGE QN: <0.1 KU/L
C HERBARUM IGE QN: <0.1 KU/L
CALIF WALNUT POLN IGE QN: <0.1 KU/L
CAT DANDER IGE QN: <0.1 KU/L
CLAM IGE QN: <0.1 KU/L
CMN PIGWEED IGE QN: <0.1 KU/L
CODFISH IGE QN: <0.1 KU/L
COMMON RAGWEED IGE QN: <0.1 KU/L
CORN IGE QN: <0.1
COTTONWOOD IGE QN: <0.1 KU/L
D FARINAE IGE QN: <0.1 KU/L
D PTERONYSS IGE QN: <0.1 KU/L
DOG DANDER IGE QN: <0.1 KU/L
EGG WHITE IGE QN: 0.18 KU/L
ENGL PLANTAIN IGE QN: <0.1 KU/L
GOOSEFOOT IGE QN: <0.1 KU/L
JOHNSON GRASS IGE QN: <0.1 KU/L
KENT BLUE GRASS IGE QN: <0.1 KU/L
LONDON PLANE IGE QN: <0.1 KU/L
MILK IGE QN: 0.26 KU/L
MT JUNIPER IGE QN: <0.1 KU/L
P NOTATUM IGE QN: <0.1 KU/L
PEANUT IGE QN: <0.1 KU/L
PECAN/HICK TREE IGE QN: <0.1 KU/L
ROACH IGE QN: <0.1 KU/L
SALTWORT IGE QN: <0.1 KU/L
SCALLOP IGE QN: <0.1 KU/L
SESAME SEED IGE QN: <0.1 KU/L
SHEEP SORREL IGE QN: <0.1 KU/L
SHRIMP IGE QN: <0.1 KU/L
SILVER BIRCH IGE QN: <0.1 KU/L
SOYBEAN IGE QN: <0.1 KU/L
TIMOTHY IGE QN: <0.1 KU/L
TOTAL IGE SMQN RAST: 54.7 KU/L
WALNUT IGE QN: <0.1 KU/L
WHEAT IGE QN: <0.1 KU/L
WHITE ASH IGE QN: <0.1 KU/L
WHITE ELM IGE QN: <0.1 KU/L
WHITE MULBERRY IGE QN: <0.1 KU/L
WHITE OAK IGE QN: <0.1 KU/L

## 2024-03-19 RX ORDER — BENZONATATE 200 MG/1
200 CAPSULE ORAL 3 TIMES DAILY PRN
Qty: 42 CAPSULE | Refills: 0 | Status: SHIPPED | OUTPATIENT
Start: 2024-03-19 | End: 2024-04-18

## 2024-03-19 RX ORDER — ERGOCALCIFEROL 1.25 MG/1
50000 CAPSULE ORAL
Qty: 12 CAPSULE | Refills: 0 | Status: SHIPPED | OUTPATIENT
Start: 2024-03-19 | End: 2024-06-11

## 2024-03-19 NOTE — TELEPHONE ENCOUNTER
Called Pt and relayed . She verbally understood. Jaqueline JOHNS also sent chang pearls into pharmacy for her.

## 2024-03-19 NOTE — TELEPHONE ENCOUNTER
Spoke to  Pt and relayed result/ recommendation to her. Pt stated at that time that she needed recommendation from neo JOHNS regarding sickness she was seen yesterday for. Stated that flonase and zyrtec are not helping. Feeling worse. Is anything else she can take to help.     Please advise

## 2024-03-20 LAB
GLIADIN PEPTIDE IGG SER IA-ACNC: <0.56 FLU (ref 0–4.99)
TTG IGG SER IA-ACNC: <0.82 FLU (ref 0–4.99)

## 2024-04-25 ENCOUNTER — TELEPHONE (OUTPATIENT)
Dept: PRIMARY CARE | Facility: CLINIC | Age: 29
End: 2024-04-25
Payer: COMMERCIAL

## 2024-04-25 DIAGNOSIS — Z79.899 REFERRED FOR MANAGEMENT OF MEDICATION THERAPY: ICD-10-CM

## 2024-04-25 NOTE — TELEPHONE ENCOUNTER
Spoke with patient.  Relayed information regarding the referral to pharmacy .  Patient verbally understood and agreed to try this.

## 2024-04-25 NOTE — TELEPHONE ENCOUNTER
Best we can do it get pharmacy involved  Is usually only covered for diabetes and the insurance companies tell patients it's 'us' who need to do things to get it covered. Which is not true. It's insurance driven no matter what I say.  Our pharmacy team can help figure it out for her/us.  Format referral if needed

## 2024-04-25 NOTE — TELEPHONE ENCOUNTER
Patient left VM stating she spoke with her insurance company and they stated that Ozempic could be covered with the proper paperwork (I am assuming a PA).  Patient stated she is having no luck losing weight and is very concerned about her fatty liver disease progressing.   Patient would like a Ozempic sent to pharmacy.  Please advise    Spoke with patient about Belkys's and her liver doctor told her to avoid that at all cost because some research is coming out that is not good regarding the compounded Semaglutide

## 2024-04-30 ENCOUNTER — TELEMEDICINE (OUTPATIENT)
Dept: PHARMACY | Facility: HOSPITAL | Age: 29
End: 2024-04-30
Payer: COMMERCIAL

## 2024-04-30 DIAGNOSIS — Z79.899 REFERRED FOR MANAGEMENT OF MEDICATION THERAPY: ICD-10-CM

## 2024-04-30 DIAGNOSIS — E66.01 CLASS 2 SEVERE OBESITY DUE TO EXCESS CALORIES WITH SERIOUS COMORBIDITY AND BODY MASS INDEX (BMI) OF 38.0 TO 38.9 IN ADULT (MULTI): Primary | ICD-10-CM

## 2024-04-30 NOTE — ASSESSMENT & PLAN NOTE
START Ozempic 0.25mg under the skin once weekly  Medication requires PA; [facility] to submit  KEY: [id number]  CONTINUE working towards healthy diet and lifestyle

## 2024-04-30 NOTE — PROGRESS NOTES
Patient ID: Arianna Suarez is a 28 y.o. female who presents for Weight Management.    Referring Provider: Edith Norton MD  PCP: Edith Norton MD Last visit with PCP: 3/18/2024; Next visit with PCP: 6/21/2024      Subjective   Treatment Adherence:   Patient did take medications today.   Number of missed doses in last 7 days: 0.       Preferred pharmacy: [facility]  Can patient afford prescribed medications: Yes, however she cannot afford GLP1 medications due to her insurance not covering    HPI  Arianna Suarez is a 28 year old female referred to the Clinical Pharmacy team for assistance with coverage of GLP1 medications. Patients insurance does not cover weight loss medications, and requires a PA on GLP1 medications approved for Type 2 Diabetes (stating must trial Metformin first).     Patient was first started in Metformin IR in April of 2023 to help with metabolic/insulin resistance. Patient was experiencing bad GI side effects (diarrhea daily) and was changed to Metformin XR 500mg. Patient states she tried to stay on Metformin XR, however it was ultimately discontinued due to daily diarrhea.    Patient is also seeing a liver specialist, due to her ALT being elevated the past 2 years. She has stage 2 non alcoholic fatty liver disease, and needs to lose weight to help bring those enzymes down. Patient states her specialist told her she did not qualify for the semaglutide trial because that was for patient with stage 3 disease. Ozempic has been seen to reduce the amount of fat in the liver, and her providers all believe it would be a great option for the patient to be on.     For the past year, patient has been fasting, trying weight watchers, and trying to lose weight but has not had much luck. Patient did see a nutritionist who also stated based on patients diet, she should not be gaining as much weight as she is.      Objective     There were no vitals taken for this visit.   BP Readings from Last 4 Encounters:  Procedure was cxl, outlook and GI lab updated.      03/18/24 119/88   08/09/23 116/89   06/28/23 109/77   05/19/23 120/90      There were no vitals filed for this visit.     Labs  Lab Results   Component Value Date    BILITOT 0.8 03/18/2024    CALCIUM 9.0 03/18/2024    CO2 27 03/18/2024     03/18/2024    CREATININE 0.69 03/18/2024    GLUCOSE 99 03/18/2024    ALKPHOS 97 03/18/2024    K 4.0 03/18/2024    PROT 6.8 03/18/2024     03/18/2024    AST 36 03/18/2024    ALT 59 (H) 03/18/2024    BUN 9 03/18/2024    ANIONGAP 11 03/18/2024    ALBUMIN 4.1 03/18/2024    GFRF >90 03/31/2023     Lab Results   Component Value Date    TRIG 189 (H) 03/18/2024    CHOL 240 (H) 03/18/2024    LDLCALC 154 (H) 03/18/2024    HDL 48.5 03/18/2024     Lab Results   Component Value Date    HGBA1C 5.1 03/31/2023       Current Outpatient Medications   Medication Instructions    citalopram (CELEXA) 40 mg, oral, Daily    doxycycline (VIBRA-TABS) 100 mg, oral, Daily    ergocalciferol (VITAMIN D-2) 50,000 Units, oral, Once Weekly    fluticasone (Flonase) 50 mcg/actuation nasal spray nasal, Every 12 hours    ketoconazole (NIZOral) 2 % shampoo WASH AFFECTED AREA DAILY LET SIT FOR 5 MINUTES THEN RINSE OFF    omega 3-dha-epa-fish oil (Fish OiL) 1,000 mg (120 mg-180 mg) capsule 1 capsule, oral, Daily         Drug Interactions;  None requiring intervention at this time    Assessment/Plan   Problem List Items Addressed This Visit             ICD-10-CM    Class 2 severe obesity due to excess calories with serious comorbidity and body mass index (BMI) of 38.0 to 38.9 in adult (Multi) - Primary E66.01, Z68.38     START Ozempic 0.25mg under the skin once weekly  Medication requires PA; [facility] to submit  KEY: [id number]  CONTINUE working towards healthy diet and lifestyle          Other Visit Diagnoses         Codes    Referred for management of medication therapy     Z79.899          Follow up with Clinical Pharmacy Team once there has been a determination on prior authorization.    Continue all meds  under the continuation of care with the referring provider and clinical pharmacy team.    Please reach out to the Clinical Pharmacy Team if there are any further questions.     Verbal consent to manage patient's drug therapy was obtained from patient. They were informed they may decline to participate or withdraw from participation in pharmacy services at any time.    Sahra Garces, PharmD  [phone]

## 2024-05-01 ENCOUNTER — APPOINTMENT (OUTPATIENT)
Dept: RADIOLOGY | Facility: CLINIC | Age: 29
End: 2024-05-01
Payer: COMMERCIAL

## 2024-05-06 ENCOUNTER — OFFICE VISIT (OUTPATIENT)
Dept: PRIMARY CARE | Facility: CLINIC | Age: 29
End: 2024-05-06
Payer: COMMERCIAL

## 2024-05-06 VITALS
SYSTOLIC BLOOD PRESSURE: 103 MMHG | OXYGEN SATURATION: 97 % | DIASTOLIC BLOOD PRESSURE: 72 MMHG | WEIGHT: 214 LBS | TEMPERATURE: 97 F | HEART RATE: 102 BPM | BODY MASS INDEX: 36.73 KG/M2

## 2024-05-06 DIAGNOSIS — R11.2 NAUSEA AND VOMITING, UNSPECIFIED VOMITING TYPE: Primary | ICD-10-CM

## 2024-05-06 PROCEDURE — 1036F TOBACCO NON-USER: CPT | Performed by: NURSE PRACTITIONER

## 2024-05-06 PROCEDURE — 99213 OFFICE O/P EST LOW 20 MIN: CPT | Performed by: NURSE PRACTITIONER

## 2024-05-06 PROCEDURE — 3008F BODY MASS INDEX DOCD: CPT | Performed by: NURSE PRACTITIONER

## 2024-05-06 RX ORDER — ONDANSETRON 8 MG/1
8 TABLET, ORALLY DISINTEGRATING ORAL EVERY 8 HOURS PRN
Qty: 20 TABLET | Refills: 0 | Status: SHIPPED | OUTPATIENT
Start: 2024-05-06 | End: 2024-05-13

## 2024-05-06 ASSESSMENT — PATIENT HEALTH QUESTIONNAIRE - PHQ9
9. THOUGHTS THAT YOU WOULD BE BETTER OFF DEAD, OR OF HURTING YOURSELF: NOT AT ALL
6. FEELING BAD ABOUT YOURSELF - OR THAT YOU ARE A FAILURE OR HAVE LET YOURSELF OR YOUR FAMILY DOWN: NEARLY EVERY DAY
10. IF YOU CHECKED OFF ANY PROBLEMS, HOW DIFFICULT HAVE THESE PROBLEMS MADE IT FOR YOU TO DO YOUR WORK, TAKE CARE OF THINGS AT HOME, OR GET ALONG WITH OTHER PEOPLE: SOMEWHAT DIFFICULT
3. TROUBLE FALLING OR STAYING ASLEEP OR SLEEPING TOO MUCH: NEARLY EVERY DAY
8. MOVING OR SPEAKING SO SLOWLY THAT OTHER PEOPLE COULD HAVE NOTICED. OR THE OPPOSITE, BEING SO FIGETY OR RESTLESS THAT YOU HAVE BEEN MOVING AROUND A LOT MORE THAN USUAL: NOT AT ALL
4. FEELING TIRED OR HAVING LITTLE ENERGY: NEARLY EVERY DAY
SUM OF ALL RESPONSES TO PHQ9 QUESTIONS 1 AND 2: 5
SUM OF ALL RESPONSES TO PHQ QUESTIONS 1-9: 16
7. TROUBLE CONCENTRATING ON THINGS, SUCH AS READING THE NEWSPAPER OR WATCHING TELEVISION: NOT AT ALL
5. POOR APPETITE OR OVEREATING: MORE THAN HALF THE DAYS
2. FEELING DOWN, DEPRESSED OR HOPELESS: MORE THAN HALF THE DAYS
1. LITTLE INTEREST OR PLEASURE IN DOING THINGS: NEARLY EVERY DAY

## 2024-05-06 NOTE — PROGRESS NOTES
Subjective   Patient ID: Arianna Suarez is a 28 y.o. female who presents for Vomiting.    Saturday didn't feel well  Too imodium since stomach was upset  But did not have diarrhea  Sunday felt fatigued  630 pm started vomiting  Nausea  Cold last night  Last vomited 1130  No fever that she knows of  No intercourse for 2 years  LMP 5/1  Heavy yesterday  No belly cramping  Had blood in vomit once          Review of Systems  ROS completely negative except what was mentioned in the HPI.  Problem List, surgical, social, and family histories which were reviewed and updated as necessary within the EMR. I also personally reviewed the notes, labs, and imaging that pertained to what was documented or discussed in the HPI.    Objective   Physical Exam  Vitals and nursing note reviewed.   Constitutional:       General: She is not in acute distress.     Appearance: Normal appearance. She is not ill-appearing.   HENT:      Head: Normocephalic and atraumatic.      Right Ear: Ear canal and external ear normal. Tympanic membrane is scarred.      Left Ear: Ear canal and external ear normal. Tympanic membrane is scarred.      Nose: Nose normal.      Mouth/Throat:      Mouth: Mucous membranes are moist.      Pharynx: Oropharynx is clear.   Eyes:      Extraocular Movements: Extraocular movements intact.      Conjunctiva/sclera: Conjunctivae normal.      Pupils: Pupils are equal, round, and reactive to light.   Cardiovascular:      Rate and Rhythm: Normal rate and regular rhythm.      Heart sounds: Normal heart sounds.   Pulmonary:      Effort: Pulmonary effort is normal.      Breath sounds: Normal breath sounds.   Abdominal:      General: Bowel sounds are normal.      Palpations: Abdomen is soft.      Tenderness: There is no abdominal tenderness. There is no right CVA tenderness or left CVA tenderness.   Musculoskeletal:         General: Normal range of motion.      Cervical back: Normal range of motion and neck supple. No rigidity or  tenderness.   Lymphadenopathy:      Cervical: No cervical adenopathy.   Skin:     General: Skin is warm and dry.   Neurological:      General: No focal deficit present.      Mental Status: She is alert and oriented to person, place, and time. Mental status is at baseline.   Psychiatric:         Mood and Affect: Mood normal.         Behavior: Behavior normal.         Thought Content: Thought content normal.         Judgment: Judgment normal.         /72 (BP Location: Left arm)   Pulse 102   Temp 36.1 °C (97 °F) (Temporal)   Wt 97.1 kg (214 lb)   SpO2 97%   BMI 36.73 kg/m²     Assessment/Plan    Problem List Items Addressed This Visit    None  Visit Diagnoses       Nausea and vomiting, unspecified vomiting type    -  Primary    Likely viral gastroenteritis.  Discussed clear liquids, bland diet, and increase as tolerated.  Zofran prn.  discussed further MANZANO if not resolving    Relevant Medications    ondansetron ODT (Zofran-ODT) 8 mg disintegrating tablet

## 2024-05-10 ENCOUNTER — APPOINTMENT (OUTPATIENT)
Dept: PHARMACY | Facility: HOSPITAL | Age: 29
End: 2024-05-10
Payer: COMMERCIAL

## 2024-06-07 ENCOUNTER — APPOINTMENT (OUTPATIENT)
Dept: PRIMARY CARE | Facility: CLINIC | Age: 29
End: 2024-06-07
Payer: COMMERCIAL

## 2024-06-21 ENCOUNTER — APPOINTMENT (OUTPATIENT)
Dept: PRIMARY CARE | Facility: CLINIC | Age: 29
End: 2024-06-21
Payer: COMMERCIAL

## 2024-06-28 ENCOUNTER — APPOINTMENT (OUTPATIENT)
Dept: PRIMARY CARE | Facility: CLINIC | Age: 29
End: 2024-06-28
Payer: COMMERCIAL

## 2024-07-05 ENCOUNTER — OFFICE VISIT (OUTPATIENT)
Dept: PRIMARY CARE | Facility: CLINIC | Age: 29
End: 2024-07-05
Payer: COMMERCIAL

## 2024-07-05 ENCOUNTER — LAB (OUTPATIENT)
Dept: LAB | Facility: LAB | Age: 29
End: 2024-07-05
Payer: COMMERCIAL

## 2024-07-05 VITALS
OXYGEN SATURATION: 98 % | TEMPERATURE: 97.9 F | WEIGHT: 195 LBS | DIASTOLIC BLOOD PRESSURE: 83 MMHG | HEART RATE: 93 BPM | SYSTOLIC BLOOD PRESSURE: 115 MMHG | BODY MASS INDEX: 33.47 KG/M2

## 2024-07-05 DIAGNOSIS — Z11.3 SCREENING FOR STD (SEXUALLY TRANSMITTED DISEASE): ICD-10-CM

## 2024-07-05 DIAGNOSIS — Z11.3 SCREENING FOR STD (SEXUALLY TRANSMITTED DISEASE): Primary | ICD-10-CM

## 2024-07-05 LAB
HAV IGM SER QL: NONREACTIVE
HBV CORE IGM SER QL: NONREACTIVE
HBV SURFACE AG SERPL QL IA: NONREACTIVE
HCG UR QL IA.RAPID: NEGATIVE
HCV AB SER QL: NONREACTIVE
HERPES SIMPLEX VIRUS 1 IGG: <0.2 INDEX
HERPES SIMPLEX VIRUS 2 IGG: <0.2 INDEX
HIV 1+2 AB+HIV1 P24 AG SERPL QL IA: NONREACTIVE
TREPONEMA PALLIDUM IGG+IGM AB [PRESENCE] IN SERUM OR PLASMA BY IMMUNOASSAY: NONREACTIVE

## 2024-07-05 PROCEDURE — 87591 N.GONORRHOEAE DNA AMP PROB: CPT

## 2024-07-05 PROCEDURE — 80074 ACUTE HEPATITIS PANEL: CPT

## 2024-07-05 PROCEDURE — 87491 CHLMYD TRACH DNA AMP PROBE: CPT

## 2024-07-05 PROCEDURE — 86780 TREPONEMA PALLIDUM: CPT

## 2024-07-05 PROCEDURE — 3008F BODY MASS INDEX DOCD: CPT | Performed by: NURSE PRACTITIONER

## 2024-07-05 PROCEDURE — 36415 COLL VENOUS BLD VENIPUNCTURE: CPT

## 2024-07-05 PROCEDURE — 99213 OFFICE O/P EST LOW 20 MIN: CPT | Performed by: NURSE PRACTITIONER

## 2024-07-05 PROCEDURE — 86696 HERPES SIMPLEX TYPE 2 TEST: CPT

## 2024-07-05 PROCEDURE — 1036F TOBACCO NON-USER: CPT | Performed by: NURSE PRACTITIONER

## 2024-07-05 PROCEDURE — 86695 HERPES SIMPLEX TYPE 1 TEST: CPT

## 2024-07-05 PROCEDURE — 81025 URINE PREGNANCY TEST: CPT

## 2024-07-05 PROCEDURE — 87661 TRICHOMONAS VAGINALIS AMPLIF: CPT

## 2024-07-05 PROCEDURE — 87389 HIV-1 AG W/HIV-1&-2 AB AG IA: CPT

## 2024-07-05 ASSESSMENT — PATIENT HEALTH QUESTIONNAIRE - PHQ9
2. FEELING DOWN, DEPRESSED OR HOPELESS: SEVERAL DAYS
1. LITTLE INTEREST OR PLEASURE IN DOING THINGS: NOT AT ALL
SUM OF ALL RESPONSES TO PHQ9 QUESTIONS 1 AND 2: 1
10. IF YOU CHECKED OFF ANY PROBLEMS, HOW DIFFICULT HAVE THESE PROBLEMS MADE IT FOR YOU TO DO YOUR WORK, TAKE CARE OF THINGS AT HOME, OR GET ALONG WITH OTHER PEOPLE: NOT DIFFICULT AT ALL

## 2024-07-05 NOTE — PROGRESS NOTES
"Problem List Items Addressed This Visit    None  Visit Diagnoses       Screening for STD (sexually transmitted disease)    -  Primary    unprotected intercourse  new partner  education provided  full panel  she is asymptomatic  prn fu io    Relevant Orders    C. Trachomatis / N. Gonorrhoeae, Amplified Detection    Trichomonas vaginalis, Nucleic Acid Detection    HIV 1/2 Antigen/Antibody Screen with Reflex to Confirmation    Syphilis Screen with Reflex    Hepatitis panel, acute    HSV1 IgG and HSV2 IgG    hCG, Urine, Qualitative    Urinalysis with Reflex Culture and Microscopic             Subjective   Patient ID: Arianna Suarez is a 28 y.o. female who presents for std check (Std check ).  HPI  Unprotected sex last week  New partner  LMP within past week  Cycles are regular  Last OCP couple years ago  Not interested in pregnancy prevention    June 2024   Pap neg  GC/CT also negative    Feels sore on the outside  Shaves  No open sores  No vag discharge  Using Lume  Odor is different from her baseline    6/23/24 US  Normal pelvic ultrasound.     Review of Systems   All other systems reviewed and are negative.      BP Readings from Last 3 Encounters:   07/05/24 115/83   05/06/24 103/72   03/18/24 119/88      Wt Readings from Last 3 Encounters:   07/05/24 88.5 kg (195 lb)   05/06/24 97.1 kg (214 lb)   03/18/24 98.8 kg (217 lb 12.8 oz)      BMI:   Estimated body mass index is 33.47 kg/m² as calculated from the following:    Height as of 8/9/23: 1.626 m (5' 4\").    Weight as of this encounter: 88.5 kg (195 lb).    Objective   Physical Exam  Constitutional:       General: She is not in acute distress.  HENT:      Head: Normocephalic and atraumatic.      Right Ear: Tympanic membrane and external ear normal.      Left Ear: Tympanic membrane and external ear normal.      Ears:      Comments: Small amount excess cerumen removed L with curette by NP     Nose: Nose normal.      Mouth/Throat:      Mouth: Mucous membranes are moist. "   Eyes:      Extraocular Movements: Extraocular movements intact.      Conjunctiva/sclera: Conjunctivae normal.   Cardiovascular:      Rate and Rhythm: Normal rate and regular rhythm.      Pulses: Normal pulses.   Pulmonary:      Effort: Pulmonary effort is normal.      Breath sounds: Normal breath sounds.   Abdominal:      General: Bowel sounds are normal.      Palpations: Abdomen is soft.   Musculoskeletal:         General: Normal range of motion.      Cervical back: Normal range of motion and neck supple.   Skin:     General: Skin is warm and dry.   Neurological:      General: No focal deficit present.      Mental Status: She is alert.   Psychiatric:         Mood and Affect: Mood normal.

## 2024-07-06 LAB
C TRACH RRNA SPEC QL NAA+PROBE: NEGATIVE
N GONORRHOEA DNA SPEC QL PROBE+SIG AMP: NEGATIVE
T VAGINALIS RRNA SPEC QL NAA+PROBE: NEGATIVE

## 2024-07-08 PROBLEM — F41.9 ANXIETY: Status: RESOLVED | Noted: 2023-04-06 | Resolved: 2024-07-08

## 2024-07-09 ENCOUNTER — APPOINTMENT (OUTPATIENT)
Dept: ALLERGY | Facility: CLINIC | Age: 29
End: 2024-07-09
Payer: COMMERCIAL

## 2024-08-09 ENCOUNTER — APPOINTMENT (OUTPATIENT)
Dept: PRIMARY CARE | Facility: CLINIC | Age: 29
End: 2024-08-09
Payer: COMMERCIAL

## 2024-08-23 DIAGNOSIS — Z00.00 ROUTINE ADULT HEALTH MAINTENANCE: ICD-10-CM

## 2024-08-30 ENCOUNTER — LAB (OUTPATIENT)
Dept: LAB | Facility: LAB | Age: 29
End: 2024-08-30
Payer: COMMERCIAL

## 2024-08-30 ENCOUNTER — APPOINTMENT (OUTPATIENT)
Dept: PRIMARY CARE | Facility: CLINIC | Age: 29
End: 2024-08-30
Payer: COMMERCIAL

## 2024-08-30 VITALS
WEIGHT: 181 LBS | DIASTOLIC BLOOD PRESSURE: 68 MMHG | BODY MASS INDEX: 30.16 KG/M2 | OXYGEN SATURATION: 98 % | SYSTOLIC BLOOD PRESSURE: 98 MMHG | HEART RATE: 104 BPM | TEMPERATURE: 98.2 F | HEIGHT: 65 IN

## 2024-08-30 DIAGNOSIS — K76.0 FATTY LIVER: ICD-10-CM

## 2024-08-30 DIAGNOSIS — K14.0 GLOSSITIS: ICD-10-CM

## 2024-08-30 DIAGNOSIS — N92.6 IRREGULAR MENSES: ICD-10-CM

## 2024-08-30 DIAGNOSIS — E66.01 CLASS 2 SEVERE OBESITY DUE TO EXCESS CALORIES WITH SERIOUS COMORBIDITY AND BODY MASS INDEX (BMI) OF 38.0 TO 38.9 IN ADULT (MULTI): ICD-10-CM

## 2024-08-30 DIAGNOSIS — Z00.00 ROUTINE ADULT HEALTH MAINTENANCE: Primary | ICD-10-CM

## 2024-08-30 DIAGNOSIS — N89.8 VAGINAL ODOR: ICD-10-CM

## 2024-08-30 DIAGNOSIS — E55.9 VITAMIN D DEFICIENCY: ICD-10-CM

## 2024-08-30 DIAGNOSIS — G62.89 OTHER POLYNEUROPATHY: ICD-10-CM

## 2024-08-30 DIAGNOSIS — F60.5 OBSESSIVE-COMPULSIVE PERSONALITY DISORDER (MULTI): ICD-10-CM

## 2024-08-30 DIAGNOSIS — L70.9 ACNE, MILD: ICD-10-CM

## 2024-08-30 PROBLEM — R68.89 FREQUENTLY SICK: Status: RESOLVED | Noted: 2024-03-18 | Resolved: 2024-08-30

## 2024-08-30 LAB — PROT UR-ACNC: 9 MG/DL (ref 5–25)

## 2024-08-30 PROCEDURE — 84166 PROTEIN E-PHORESIS/URINE/CSF: CPT | Performed by: INTERNAL MEDICINE

## 2024-08-30 PROCEDURE — 84630 ASSAY OF ZINC: CPT

## 2024-08-30 PROCEDURE — 84165 PROTEIN E-PHORESIS SERUM: CPT | Performed by: INTERNAL MEDICINE

## 2024-08-30 PROCEDURE — 84156 ASSAY OF PROTEIN URINE: CPT

## 2024-08-30 PROCEDURE — 3008F BODY MASS INDEX DOCD: CPT | Performed by: INTERNAL MEDICINE

## 2024-08-30 PROCEDURE — 86320 SERUM IMMUNOELECTROPHORESIS: CPT | Performed by: INTERNAL MEDICINE

## 2024-08-30 PROCEDURE — 99213 OFFICE O/P EST LOW 20 MIN: CPT | Performed by: INTERNAL MEDICINE

## 2024-08-30 PROCEDURE — 86325 OTHER IMMUNOELECTROPHORESIS: CPT | Performed by: INTERNAL MEDICINE

## 2024-08-30 PROCEDURE — 86335 IMMUNFIX E-PHORSIS/URINE/CSF: CPT

## 2024-08-30 PROCEDURE — 99395 PREV VISIT EST AGE 18-39: CPT | Performed by: INTERNAL MEDICINE

## 2024-08-30 PROCEDURE — 84207 ASSAY OF VITAMIN B-6: CPT

## 2024-08-30 PROCEDURE — 82746 ASSAY OF FOLIC ACID SERUM: CPT

## 2024-08-30 PROCEDURE — 82607 VITAMIN B-12: CPT

## 2024-08-30 PROCEDURE — 84425 ASSAY OF VITAMIN B-1: CPT

## 2024-08-30 PROCEDURE — 36415 COLL VENOUS BLD VENIPUNCTURE: CPT

## 2024-08-30 PROCEDURE — 1036F TOBACCO NON-USER: CPT | Performed by: INTERNAL MEDICINE

## 2024-08-30 PROCEDURE — 82180 ASSAY OF ASCORBIC ACID: CPT

## 2024-08-30 PROCEDURE — 86334 IMMUNOFIX E-PHORESIS SERUM: CPT

## 2024-08-30 PROCEDURE — 84155 ASSAY OF PROTEIN SERUM: CPT

## 2024-08-30 PROCEDURE — 84165 PROTEIN E-PHORESIS SERUM: CPT

## 2024-08-30 PROCEDURE — 84166 PROTEIN E-PHORESIS/URINE/CSF: CPT

## 2024-08-30 RX ORDER — METRONIDAZOLE 7.5 MG/G
GEL VAGINAL 2 TIMES DAILY
Qty: 70 G | Refills: 1 | Status: SHIPPED | OUTPATIENT
Start: 2024-08-30 | End: 2025-08-30

## 2024-08-30 RX ORDER — NYSTATIN 100000 [USP'U]/ML
SUSPENSION ORAL
Qty: 280 ML | Refills: 1 | Status: SHIPPED | OUTPATIENT
Start: 2024-08-30 | End: 2024-08-31

## 2024-08-30 RX ORDER — ERGOCALCIFEROL 1.25 MG/1
50000 CAPSULE ORAL WEEKLY
Qty: 12 CAPSULE | Refills: 3 | Status: SHIPPED | OUTPATIENT
Start: 2024-08-30 | End: 2025-08-30

## 2024-08-30 RX ORDER — KETOCONAZOLE 20 MG/G
CREAM TOPICAL 2 TIMES DAILY
Qty: 60 G | Refills: 2 | Status: SHIPPED | OUTPATIENT
Start: 2024-08-30 | End: 2025-08-30

## 2024-08-30 ASSESSMENT — PATIENT HEALTH QUESTIONNAIRE - PHQ9
1. LITTLE INTEREST OR PLEASURE IN DOING THINGS: NOT AT ALL
SUM OF ALL RESPONSES TO PHQ9 QUESTIONS 1 AND 2: 0
2. FEELING DOWN, DEPRESSED OR HOPELESS: NOT AT ALL

## 2024-08-30 NOTE — PROGRESS NOTES
ANNUAL CPE VISIT  Chief Complaint   Patient presents with    Annual Exam   Also multiple issues    HPI: Started Semaglutide/$200/month cash pay  Had US pelvis in June :   Normal pelvic ultrasound.   Endometrial thickness, total 5.5 mm   Done to r/o PCOS  Endo referral r/o cortisolism    Feet often look purple and toes seem numb  Since last year  She has numbness bilateral legs inner aspect  Started 2 months ago  No Pain there  but pain higher in her leg  No back pain    Also acne scarring on face, back and chest  Tried Doxy in past   and nick Verde worked in past  On retin A  Excess hair  Saw [facility] in the past  Had microneedling done    Tongue is red  Grooves in it    99% sure she has vaginal bleeding with Bms  Put tampon in and had blood in it  Saw GYN  Had US  Off OCP, doesn't want to go back on it  + vaginal odor      Component      Latest Ref Rng 9/14/2023   PROLACTIN      3.0 - 20.0 ug/L 11.8    Cortisol  A.M.      5.0 - 20.0 ug/dL 1.1 (L)         Labs reviewed:  Component      Latest Ref Rng 3/18/2024   Immunocap IgE      <=214 KU/L 54.7    Bermuda Grass IgE      <0.10 kU/L <0.10    Ming Grass IgE      <0.10 kU/L <0.10    Meadow Grass, [place] Blue IgE      <0.10 kU/L <0.10    Darrel Grass IgE      <0.10 kU/L <0.10    Goosefoot, Lamb's Quarters IgE      <0.10 kU/L <0.10    Common Pigweed IgE      <0.10 kU/L <0.10    Common Ragweed IgE      <0.10 kU/L <0.10    White Misael IgE      <0.10 kU/L <0.10    Common Silver Birch IgE      <0.10 kU/L <0.10    Box-Elder IgE      <0.10 kU/L <0.10    Mountain Juniper IgE      <0.10 kU/L <0.10    [place] IgE      <0.10 kU/L <0.10    Elm IgE      <0.10 kU/L <0.10    S[place]ern IgE      <0.10 kU/L <0.10    Pecan, Hickory IgE      <0.10 kU/L <0.10    Maple [place], [place] IgE      <0.10 kU/L <0.10    [place] IgE      <0.10 kU/L <0.10    [place] IgE      <0.10 kU/L <0.10    Russian Thistle IgE      <0.10 kU/L <0.10    Sheep Sorrel IgE      <0.10 kU/L <0.10     Cat Dander IgE      <0.10 kU/L <0.10    Dog Dander IgE      <0.10 kU/L <0.10    Alternaria Alternata IgE      <0.10 kU/L <0.10    Cladosporium Herbarum IgE      <0.10 kU/L <0.10    English Plantain IgE      <0.10 kU/L <0.10    Dust Mite (D. farinae) IgE      <0.10 kU/L <0.10    Dust Mite (D. pteronyssinus) IgE      <0.10 kU/L <0.10    Barbadian Cockroach IgE      <0.10 kU/L <0.10    Aspergillus Fumigatus IgE      <0.10 kU/L <0.10    Oak IgE      <0.10 kU/L <0.10    Penicillium Chrysogenum IgE      <0.10 kU/L <0.10    WBC      4.4 - 11.3 x10*3/uL 10.2    nRBC      0.0 - 0.0 /100 WBCs 0.0    RBC      4.00 - 5.20 x10*6/uL 4.75    HEMOGLOBIN      12.0 - 16.0 g/dL 14.5    HEMATOCRIT      36.0 - 46.0 % 44.6    MCV      80 - 100 fL 94    MCH      26.0 - 34.0 pg 30.5    MCHC      32.0 - 36.0 g/dL 32.5    RED CELL DISTRIBUTION WIDTH      11.5 - 14.5 % 12.7    Platelets      150 - 450 x10*3/uL 311    Neutrophils %      40.0 - 80.0 % 76.5    Immature Granulocytes %, Automated      0.0 - 0.9 % 0.2    Lymphocytes %      13.0 - 44.0 % 16.4    Monocytes %      2.0 - 10.0 % 4.9    Eosinophils %      0.0 - 6.0 % 1.7    Basophils %      0.0 - 2.0 % 0.3    Neutrophils Absolute      1.20 - 7.70 x10*3/uL 7.82 (H)    Immature Granulocytes Absolute, Automated      0.00 - 0.70 x10*3/uL 0.02    Lymphocytes Absolute      1.20 - 4.80 x10*3/uL 1.67    Monocytes Absolute      0.10 - 1.00 x10*3/uL 0.50    Eosinophils Absolute      0.00 - 0.70 x10*3/uL 0.17    Basophils Absolute      0.00 - 0.10 x10*3/uL 0.03    GLUCOSE      74 - 99 mg/dL 99    SODIUM      136 - 145 mmol/L 141    POTASSIUM      3.5 - 5.3 mmol/L 4.0    CHLORIDE      98 - 107 mmol/L 107    Bicarbonate      21 - 32 mmol/L 27    Anion Gap      10 - 20 mmol/L 11    Blood Urea Nitrogen      6 - 23 mg/dL 9    Creatinine      0.50 - 1.05 mg/dL 0.69    EGFR      >60 mL/min/1.73m*2 >90    Calcium      8.6 - 10.3 mg/dL 9.0    Albumin      3.4 - 5.0 g/dL 4.1    Alkaline Phosphatase      33 -  110 U/L 97    Total Protein      6.4 - 8.2 g/dL 6.8    AST      9 - 39 U/L 36    Bilirubin Total      0.0 - 1.2 mg/dL 0.8    ALT      7 - 45 U/L 59 (H)    Clam IgE      <0.10 kU/L <0.10    Fish (Cod) IgE      <0.10 kU/L <0.10    Whitestone, Corn IgE <0.10    Egg White IgE      <0.10 kU/L 0.18 ! (Equiv IgE)    Cow's Milk IgE      <0.10 kU/L 0.26 ! (Equiv IgE)    Peanut IgE      <0.10 kU/L <0.10    Scallop IgE      <0.10 kU/L <0.10    Sesame Seed IgE      <0.10 kU/L <0.10    Shrimp IgE      <0.10 kU/L <0.10    Soybean IgE      <0.10 kU/L <0.10    Wheat IgE      <0.10 kU/L <0.10    CHOLESTEROL      0 - 199 mg/dL 240 (H)    HDL CHOLESTEROL      mg/dL 48.5    Cholesterol/HDL Ratio 4.9    LDL Calculated      <=99 mg/dL 154 (H)    VLDL      0 - 40 mg/dL 38    TRIGLYCERIDES      0 - 149 mg/dL 189 (H)    Non HDL Cholesterol      0 - 149 mg/dL 192 (H)    IgG      700 - 1,600 mg/dL 1,050    IgA      70 - 400 mg/dL 132    IgM      40 - 230 mg/dL 57    Vitamin D, 25-Hydroxy, Total      30 - 100 ng/mL 12 (L)    PATRICIA      Negative  Negative    IgE      0 - 214 IU/mL 24    Thyroid Stimulating Hormone      0.44 - 3.98 mIU/L 1.04    Tissue Transglutaminase, IgA      <15.0 U/mL <1.0    Tissue Transglutamase IgG      0.00 - 4.99 FLU <0.82    Deamidated Gliadin Antibody IgA      <15.0 U/mL <1.0    Deamidated Gliadin Antibody IgG      0.00 - 4.99 FLU <0.56    Bilirubin, Direct      0.0 - 0.3 mg/dL 0.1           Assessment and Plan:  Problem List Items Addressed This Visit          High    Routine adult health maintenance - Primary    Overview     COVID-19 vaccine (MODERNA)3/31/2021, 3/3/2021, 12/7/21  DTaP, unspecified formulation8/1/2007, 4/26/2000, 7/26/1996  HIB - 3 Dose Schedule3/25/1996, 6/26/1995, 4/13/1995, ... (1 more)  HUMAN PAPILLOMAVIRUS QUADRIVALENT - Male and Rmkodkl96/1/2007, 5/31/2007, 3/29/2007  Hepatitis A Peds/Adol1/20/2010, 7/20/2009  Hepatitis B Peds/Adol6/26/1995, 4/13/1995, 2/21/1995  IPV7/25/1996  Influenza Seasonal Inj  Quad Age 6 Mo-64 Yrs Pres Free10/14/2017, 10/1/21  MMR5/25/2000, 3/25/1996  Meningococcal Conjugate MCV4P Vaccine, IM8/21/2013, 3/1/2006  Poliovirus, unspecified formulation7/26/1996, 6/26/1995, 4/13/1995, ... (1 more)  Tdap (Age 7+)8/1/2007  Td 4/21/22  PAP 5/7/21 (-), 6/11/24 (-) Sees GYN  GC/CHlamydia 9/18/17 (-)         Current Assessment & Plan     Annual Wellness exam completed   Preventive Health History reviewed  Ordered:   Labs                  Medium    Acne, mild    Overview     Tretinoin cream   Doxy daily  Off OCP since 2023  Sees Derm          Current Assessment & Plan     Derm referral for help with scarring  Consider OCP (perhaps Chhaya/Arminda) or Spironolactone         Relevant Medications    ketoconazole (NIZOral) 2 % cream    Other Relevant Orders    Referral to Dermatology    Class 2 severe obesity due to excess calories with serious comorbidity and body mass index (BMI) of 38.0 to 38.9 in adult (Multi)    Overview     Starting weight 227lbs  Does WW, IF  is On Celexa  Failed Metformin (diarrhea)  S/p endo consult, cortisol normal  Started Semaglutide in June 2024 (weight was 207 when started it)         Fatty liver    Overview     5/23: US abd: Hepatic steatosis.  Cholelithiasis  5/23 Fib-4 Score: 3.42  S/P GI consult  Fibroscan 6/23: , kPa 15  S/p Liver biopsy 8/23: Moderate macrovesicular steatosis, grade 2  On Semaglutide for weight loss           Obsessive-compulsive personality disorder (Multi)    Overview     On Celexa   Sees Ashley Hathaway;         Vitamin D deficiency    Overview     2007: 30  4/22: 15  4/23: 12, started 50K daily  Goal ~50         Current Assessment & Plan     Resume 50K weekly         Relevant Medications    ergocalciferol (Vitamin D-2) 1.25 MG (09535 UT) capsule    Other Relevant Orders    Vitamin D 25-Hydroxy,Total (for eval of Vitamin D levels)     Other Visit Diagnoses       Other polyneuropathy        r/o cuases  Consider MVIT (? if due to dietary cause)     "Relevant Orders    Vitamin B12    Folate    Serum Protein Electrophoresis + Immunofixation    Urine Protein Electrophoresis + Immunofixation    Vitamin B6    Irregular menses        Reluctant to go back on OCP    Vaginal odor        Will try treating empirically for Bacteria overgrowth (recent STD panel (-)  If helps will do once weekly and see if helps  If not consider boric acid vaginal    Relevant Medications    metroNIDAZOLE (Metrogel VaginaL) 0.75 % (37.5mg/5 gram) vaginal gel    Glossitis        check vitamin deficiencies  Try Nystatin S&S  Consider MMM    Relevant Medications    nystatin (Mycostatin) 100,000 unit/mL suspension    Other Relevant Orders    Vitamin C    Zinc    Vitamin B1, Whole Blood              ROS otherwise negative aside from what was mentioned above in HPI.    Vitals  BP 98/68   Pulse 104   Temp 36.8 °C (98.2 °F)   Ht 1.638 m (5' 4.5\")   Wt 82.1 kg (181 lb)   SpO2 98%   BMI 30.59 kg/m²   Body mass index is 30.59 kg/m².  Physical Exam  Gen: Alert, NAD  HEENT:  Tongue has some areas of smooth mucosa and grooves, mild white discoloration  Neck:  No masses/nodes palpable; Thyroid nontender and without nodules; No YON  Respiratory:  Lungs CTAB  CV: RRR, DP +  Neuro:  Gross motor and sensory intact except she has decreased sensation biletarlly symmetrically inner LE  Skin:  skin lesions on posterior back look like pityriasis rosea or tinea versicolor infection  Breast: No masses or axillary lymphadenopathy  Rectal: Guaiac (-), no masses (Palated her tampon in the vagina)   (Pt declined chaperone)    Allergies and Medications  Metformin, Penicillins, and Zofran [ondansetron hcl]  Current Outpatient Medications   Medication Instructions    ergocalciferol (VITAMIN D-2) 50,000 Units, oral, Weekly    ketoconazole (NIZOral) 2 % cream Topical, 2 times daily    metroNIDAZOLE (Metrogel VaginaL) 0.75 % (37.5mg/5 gram) vaginal gel vaginal, 2 times daily    nystatin (Mycostatin) 100,000 unit/mL " suspension Swish in mouth and spit out 5x/day    SEMAGLUTIDE SUBQ 10 Units, subcutaneous, Weekly (0600), Starting 20 units 7/8/24       Active Problem List  Patient Active Problem List   Diagnosis    Acne, mild    Hyperlipidemia, mixed    Obsessive-compulsive personality disorder (Multi)    Vitamin D deficiency    Class 2 severe obesity due to excess calories with serious comorbidity and body mass index (BMI) of 38.0 to 38.9 in adult (Multi)    Routine adult health maintenance    Anxiety and depression    Hypertrophy of breast    Fatty liver    Cholelithiasis       Comprehensive Medical/Surgical/Social/Family History  Past Medical History:   Diagnosis Date    H/O diagnostic tests     6/23 Fibroscan: , kPa 15    H/O diagnostic ultrasound 05/18/2023    US abd: Hepatic steatosis.   Cholelithiasis    History of ultrasound, pelvic 06/2024    Normal pelvic ultrasound.     Past Surgical History:   Procedure Laterality Date    LIVER BIOPSY      9/23: Stage 2 fibrosis       Social History     Social History Narrative    Social History:    Single, no kids    Works at "[employer]"    Nonsmoker    Rarely ETOH    ---    Family History:    M: HTN, BRCA test pending    F: Skin CA    S: Sada. Fibroadenoma of breast, Fatty Liver, Skin CA    MGM: Breast CA, CVA    MGF: Prostate CA, Thyroid, HTN    PGF: CVA

## 2024-08-31 PROBLEM — E53.8 VITAMIN B12 DEFICIENCY: Status: ACTIVE | Noted: 2024-08-31

## 2024-08-31 PROBLEM — E53.8 FOLATE DEFICIENCY: Status: ACTIVE | Noted: 2024-08-31

## 2024-08-31 LAB
FOLATE SERPL-MCNC: 3.2 NG/ML
PROT SERPL-MCNC: 6.8 G/DL (ref 6.4–8.2)
VIT B12 SERPL-MCNC: 176 PG/ML (ref 211–911)

## 2024-08-31 RX ORDER — NYSTATIN 100000 [USP'U]/ML
SUSPENSION ORAL
Qty: 280 ML | Refills: 1 | Status: SHIPPED | OUTPATIENT
Start: 2024-08-31

## 2024-09-02 LAB — ZINC SERPL-MCNC: 66.3 UG/DL (ref 60–120)

## 2024-09-03 LAB — VIT C SERPL-MCNC: 9 UMOL/L (ref 23–114)

## 2024-09-04 DIAGNOSIS — E53.8 FOLATE DEFICIENCY: ICD-10-CM

## 2024-09-04 DIAGNOSIS — E54 VITAMIN C DEFICIENCY: ICD-10-CM

## 2024-09-04 DIAGNOSIS — E53.8 B12 DEFICIENCY: ICD-10-CM

## 2024-09-04 LAB
ALBUMIN MFR UR ELPH: 32.4 %
ALBUMIN: 4 G/DL (ref 3.4–5)
ALPHA 1 GLOBULIN: 0.3 G/DL (ref 0.2–0.6)
ALPHA 2 GLOBULIN: 0.7 G/DL (ref 0.4–1.1)
ALPHA1 GLOB MFR UR ELPH: 8.3 %
ALPHA2 GLOB MFR UR ELPH: 20.1 %
B-GLOBULIN MFR UR ELPH: 24 %
BETA GLOBULIN: 0.9 G/DL (ref 0.5–1.2)
GAMMA GLOB MFR UR ELPH: 15.2 %
GAMMA GLOBULIN: 0.9 G/DL (ref 0.5–1.4)
IMMUNOFIXATION COMMENT: NORMAL
IMMUNOFIXATION COMMENT: NORMAL
PATH REVIEW - SERUM IMMUNOFIXATION: NORMAL
PATH REVIEW - URINE IMMUNOFIXATION: NORMAL
PATH REVIEW-SERUM PROTEIN ELECTROPHORESIS: NORMAL
PATH REVIEW-URINE PROTEIN ELECTROPHORESIS: NORMAL
PROTEIN ELECTROPHORESIS COMMENT: NORMAL
PYRIDOXAL PHOS SERPL-SCNC: 25 NMOL/L (ref 20–125)
URINE ELECTROPHORESIS COMMENT: NORMAL

## 2024-09-05 ENCOUNTER — PATIENT MESSAGE (OUTPATIENT)
Dept: PRIMARY CARE | Facility: CLINIC | Age: 29
End: 2024-09-05
Payer: COMMERCIAL

## 2024-09-05 DIAGNOSIS — L70.9 ACNE, MILD: ICD-10-CM

## 2024-09-05 PROBLEM — E51.9 THIAMINE DEFICIENCY: Status: ACTIVE | Noted: 2024-09-05

## 2024-09-05 LAB — VIT B1 PYROPHOSHATE BLD-SCNC: 63 NMOL/L (ref 70–180)

## 2024-09-05 RX ORDER — DROSPIRENONE AND ETHINYL ESTRADIOL 0.02-3(28)
1 KIT ORAL DAILY
Qty: 84 TABLET | Refills: 3 | Status: SHIPPED | OUTPATIENT
Start: 2024-09-05

## 2024-09-06 ENCOUNTER — CLINICAL SUPPORT (OUTPATIENT)
Dept: PRIMARY CARE | Facility: CLINIC | Age: 29
End: 2024-09-06
Payer: COMMERCIAL

## 2024-09-06 DIAGNOSIS — E53.8 VITAMIN B12 DEFICIENCY: ICD-10-CM

## 2024-09-06 DIAGNOSIS — K14.0 GLOSSITIS: ICD-10-CM

## 2024-09-06 PROCEDURE — 96372 THER/PROPH/DIAG INJ SC/IM: CPT | Performed by: NURSE PRACTITIONER

## 2024-09-06 RX ORDER — CYANOCOBALAMIN 1000 UG/ML
INJECTION, SOLUTION INTRAMUSCULAR; SUBCUTANEOUS
Qty: 12 ML | Refills: 3 | Status: SHIPPED | OUTPATIENT
Start: 2024-09-06

## 2024-09-06 RX ORDER — CYANOCOBALAMIN 1000 UG/ML
1000 INJECTION, SOLUTION INTRAMUSCULAR; SUBCUTANEOUS ONCE
Status: COMPLETED | OUTPATIENT
Start: 2024-09-06 | End: 2024-09-06

## 2024-12-16 ENCOUNTER — TELEPHONE (OUTPATIENT)
Dept: PRIMARY CARE | Facility: CLINIC | Age: 29
End: 2024-12-16
Payer: COMMERCIAL

## 2024-12-16 DIAGNOSIS — N89.8 VAGINAL CYST: Primary | ICD-10-CM

## 2024-12-16 NOTE — TELEPHONE ENCOUNTER
Patient requesting vaginal exam for cyst. Please advise if appointment needed or referral to gynecology.

## 2024-12-22 ENCOUNTER — OFFICE VISIT (OUTPATIENT)
Dept: URGENT CARE | Age: 29
End: 2024-12-22
Payer: COMMERCIAL

## 2024-12-22 VITALS
DIASTOLIC BLOOD PRESSURE: 91 MMHG | HEART RATE: 78 BPM | TEMPERATURE: 97.8 F | OXYGEN SATURATION: 98 % | SYSTOLIC BLOOD PRESSURE: 134 MMHG | RESPIRATION RATE: 16 BRPM

## 2024-12-22 DIAGNOSIS — H92.01 OTALGIA OF RIGHT EAR: Primary | ICD-10-CM

## 2024-12-22 DIAGNOSIS — S00.03XA CONTUSION OF SCALP, INITIAL ENCOUNTER: ICD-10-CM

## 2024-12-22 PROCEDURE — 99213 OFFICE O/P EST LOW 20 MIN: CPT

## 2024-12-22 PROCEDURE — 1036F TOBACCO NON-USER: CPT

## 2024-12-22 RX ORDER — BROMPHENIRAMINE MALEATE, PSEUDOEPHEDRINE HYDROCHLORIDE, AND DEXTROMETHORPHAN HYDROBROMIDE 2; 30; 10 MG/5ML; MG/5ML; MG/5ML
10 SYRUP ORAL 4 TIMES DAILY PRN
Qty: 120 ML | Refills: 0 | Status: SHIPPED | OUTPATIENT
Start: 2024-12-22 | End: 2025-01-01

## 2024-12-22 ASSESSMENT — ENCOUNTER SYMPTOMS
STRIDOR: 0
JOINT SWELLING: 0
MYALGIAS: 0
WEAKNESS: 0
FACIAL ASYMMETRY: 0
SORE THROAT: 0
DIARRHEA: 0
FATIGUE: 0
CHOKING: 0
APPETITE CHANGE: 1
NECK PAIN: 0
COUGH: 1
CARDIOVASCULAR NEGATIVE: 1
ARTHRALGIAS: 0
BACK PAIN: 0
BRUISES/BLEEDS EASILY: 0
DIZZINESS: 1
NUMBNESS: 0
GASTROINTESTINAL NEGATIVE: 1
SPEECH DIFFICULTY: 0
COLOR CHANGE: 0
DIAPHORESIS: 0
PALPITATIONS: 0
ABDOMINAL PAIN: 0
RHINORRHEA: 1
PHOTOPHOBIA: 0
LIGHT-HEADEDNESS: 1
SINUS PRESSURE: 0
SHORTNESS OF BREATH: 0
VOMITING: 0
NAUSEA: 0
CHEST TIGHTNESS: 0
WHEEZING: 0
NECK STIFFNESS: 0
TROUBLE SWALLOWING: 0
FEVER: 0
SEIZURES: 0
CHILLS: 0
HEADACHES: 1
TREMORS: 0
SINUS PAIN: 0
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
## 2025-01-02 ENCOUNTER — OFFICE VISIT (OUTPATIENT)
Dept: PRIMARY CARE | Facility: CLINIC | Age: 30
End: 2025-01-02
Payer: COMMERCIAL

## 2025-01-02 VITALS
OXYGEN SATURATION: 97 % | DIASTOLIC BLOOD PRESSURE: 84 MMHG | WEIGHT: 167 LBS | BODY MASS INDEX: 27.82 KG/M2 | SYSTOLIC BLOOD PRESSURE: 121 MMHG | HEIGHT: 65 IN | HEART RATE: 91 BPM | TEMPERATURE: 97.6 F

## 2025-01-02 DIAGNOSIS — J06.9 UPPER RESPIRATORY TRACT INFECTION, UNSPECIFIED TYPE: Primary | ICD-10-CM

## 2025-01-02 PROCEDURE — 1036F TOBACCO NON-USER: CPT | Performed by: NURSE PRACTITIONER

## 2025-01-02 PROCEDURE — 99213 OFFICE O/P EST LOW 20 MIN: CPT | Performed by: NURSE PRACTITIONER

## 2025-01-02 PROCEDURE — 3008F BODY MASS INDEX DOCD: CPT | Performed by: NURSE PRACTITIONER

## 2025-01-02 RX ORDER — DOXYCYCLINE 100 MG/1
100 CAPSULE ORAL 2 TIMES DAILY
Qty: 14 CAPSULE | Refills: 0 | Status: SHIPPED | OUTPATIENT
Start: 2025-01-02 | End: 2025-01-09

## 2025-01-02 RX ORDER — FLUTICASONE PROPIONATE 50 MCG
1 SPRAY, SUSPENSION (ML) NASAL DAILY
Qty: 16 G | Refills: 5 | Status: SHIPPED | OUTPATIENT
Start: 2025-01-02 | End: 2026-01-02

## 2025-01-02 NOTE — PROGRESS NOTES
"Problem List Items Addressed This Visit    None  Visit Diagnoses       Upper respiratory tract infection, unspecified type    -  Primary    improving day 7  wait & see RX sent given she is traveling out of state tomorrow  prn fu IO  aware we can't provide care out of state    Relevant Medications    fluticasone (Flonase) 50 mcg/actuation nasal spray    doxycycline (Vibramycin) 100 mg capsule             Subjective   Patient ID: Arianna Suarez is a 29 y.o. female who presents for Sick Visit (Had loss of voice/Coughing, congestion , fever /12/27- is when she lost voice ).  HPI  Sx x 7 days  Zycam   Vitamin c  Mucinex cold & flu  Feeling improved today     Review of Systems   All other systems reviewed and are negative.      BP Readings from Last 3 Encounters:   01/02/25 121/84   12/22/24 (!) 134/91   08/30/24 98/68      Wt Readings from Last 3 Encounters:   01/02/25 75.8 kg (167 lb)   08/30/24 82.1 kg (181 lb)   07/05/24 88.5 kg (195 lb)      BMI:   Estimated body mass index is 28.22 kg/m² as calculated from the following:    Height as of this encounter: 1.638 m (5' 4.5\").    Weight as of this encounter: 75.8 kg (167 lb).    Objective   Physical Exam  Constitutional:       General: She is not in acute distress.  HENT:      Head: Normocephalic and atraumatic.      Right Ear: Tympanic membrane and external ear normal.      Left Ear: Tympanic membrane and external ear normal.      Nose: Nose normal.      Mouth/Throat:      Mouth: Mucous membranes are moist.   Eyes:      Extraocular Movements: Extraocular movements intact.      Conjunctiva/sclera: Conjunctivae normal.   Cardiovascular:      Rate and Rhythm: Normal rate and regular rhythm.      Pulses: Normal pulses.   Pulmonary:      Effort: Pulmonary effort is normal.      Breath sounds: Normal breath sounds.   Musculoskeletal:         General: Normal range of motion.      Cervical back: Normal range of motion and neck supple.   Skin:     General: Skin is warm and dry. "   Neurological:      General: No focal deficit present.      Mental Status: She is alert.   Psychiatric:         Mood and Affect: Mood normal.

## 2025-01-06 ENCOUNTER — OFFICE VISIT (OUTPATIENT)
Dept: PRIMARY CARE | Facility: CLINIC | Age: 30
End: 2025-01-06
Payer: COMMERCIAL

## 2025-01-06 VITALS
BODY MASS INDEX: 27.72 KG/M2 | SYSTOLIC BLOOD PRESSURE: 116 MMHG | TEMPERATURE: 97.2 F | HEART RATE: 95 BPM | WEIGHT: 166.4 LBS | OXYGEN SATURATION: 99 % | HEIGHT: 65 IN | DIASTOLIC BLOOD PRESSURE: 81 MMHG

## 2025-01-06 DIAGNOSIS — J02.9 PHARYNGITIS, UNSPECIFIED ETIOLOGY: ICD-10-CM

## 2025-01-06 DIAGNOSIS — K13.0 LIP ULCER: Primary | ICD-10-CM

## 2025-01-06 LAB — POC RAPID STREP: NEGATIVE

## 2025-01-06 PROCEDURE — 1036F TOBACCO NON-USER: CPT | Performed by: NURSE PRACTITIONER

## 2025-01-06 PROCEDURE — 99213 OFFICE O/P EST LOW 20 MIN: CPT | Performed by: NURSE PRACTITIONER

## 2025-01-06 PROCEDURE — 3008F BODY MASS INDEX DOCD: CPT | Performed by: NURSE PRACTITIONER

## 2025-01-06 PROCEDURE — 87880 STREP A ASSAY W/OPTIC: CPT | Performed by: NURSE PRACTITIONER

## 2025-01-06 PROCEDURE — 87529 HSV DNA AMP PROBE: CPT

## 2025-01-06 RX ORDER — ACYCLOVIR 50 MG/G
1 OINTMENT TOPICAL
Qty: 5 G | Refills: 0 | Status: SHIPPED | OUTPATIENT
Start: 2025-01-06 | End: 2025-01-10

## 2025-01-06 ASSESSMENT — PATIENT HEALTH QUESTIONNAIRE - PHQ9
SUM OF ALL RESPONSES TO PHQ9 QUESTIONS 1 AND 2: 0
2. FEELING DOWN, DEPRESSED OR HOPELESS: NOT AT ALL
1. LITTLE INTEREST OR PLEASURE IN DOING THINGS: NOT AT ALL

## 2025-01-06 NOTE — PROGRESS NOTES
"Problem List Items Addressed This Visit    None  Visit Diagnoses       Lip ulcer    -  Primary    started prior to doxy  viral swab HSV sent  try acyclovir topical  stop doxy for now    Relevant Medications    acyclovir (Zovirax) 5 % ointment    Other Relevant Orders    HSV PCR    Pharyngitis, unspecified etiology        rapid strep    Relevant Orders    POCT Rapid Strep A manually resulted (Completed)             Subjective   Patient ID: Arianna Suarez is a 29 y.o. female who presents for Sick Visit (Sore throat started antibiotic on Saturday cough nausea not much of an appetite/Swollen lip sore s on lips).  HPI  Had sx x 9 days and started doxy  She had been feeling improved  Then vomited on plane  Nausea, LH at airport  Vomited on airplane descent  Felt ok next day, not great  Throat started to hurt more to swallow  She was coughing a lot  So then started doxy 1/4/24  - has had 4 doses   No fever reducers    Nasal drainage is clear  R side of throat feels swollen  Last dose doxy this morning 0930    Swollen lip  Prior to taking doxy  Lips were cracking prior, dry mouth  Mouth sores prior to doxy  Tried blistex   Using aquaphor  - swelling improved    Review of Systems   All other systems reviewed and are negative.      BP Readings from Last 3 Encounters:   01/06/25 116/81   01/02/25 121/84   12/22/24 (!) 134/91      Wt Readings from Last 3 Encounters:   01/06/25 75.5 kg (166 lb 6.4 oz)   01/02/25 75.8 kg (167 lb)   08/30/24 82.1 kg (181 lb)      BMI:   Estimated body mass index is 28.12 kg/m² as calculated from the following:    Height as of this encounter: 1.638 m (5' 4.5\").    Weight as of this encounter: 75.5 kg (166 lb 6.4 oz).    Objective   Physical Exam  Constitutional:       General: She is not in acute distress.  HENT:      Head: Normocephalic and atraumatic.      Right Ear: Tympanic membrane and external ear normal.      Left Ear: Tympanic membrane and external ear normal.      Nose: Nose normal.      " Mouth/Throat:      Pharynx: No oropharyngeal exudate or posterior oropharyngeal erythema.      Comments: Bottom lip cracked  Inside top lip single circular white ulcer; same under her tongue  Eyes:      Extraocular Movements: Extraocular movements intact.      Conjunctiva/sclera: Conjunctivae normal.   Cardiovascular:      Rate and Rhythm: Normal rate and regular rhythm.      Pulses: Normal pulses.   Pulmonary:      Effort: Pulmonary effort is normal.      Breath sounds: Normal breath sounds.   Musculoskeletal:         General: Normal range of motion.      Cervical back: Normal range of motion and neck supple.   Skin:     General: Skin is warm and dry.   Neurological:      General: No focal deficit present.      Mental Status: She is alert.   Psychiatric:         Mood and Affect: Mood normal.

## 2025-01-07 LAB
HSV1 DNA SKIN QL NAA+PROBE: DETECTED
HSV2 DNA SKIN QL NAA+PROBE: NOT DETECTED

## 2025-01-08 ENCOUNTER — TELEPHONE (OUTPATIENT)
Dept: PRIMARY CARE | Facility: CLINIC | Age: 30
End: 2025-01-08
Payer: COMMERCIAL

## 2025-01-08 DIAGNOSIS — Z86.19 HISTORY OF POSITIVE PCR FOR HERPES SIMPLEX VIRUS TYPE 1 (HSV-1) DNA: Primary | ICD-10-CM

## 2025-01-08 RX ORDER — ACYCLOVIR 400 MG/1
400 TABLET ORAL 3 TIMES DAILY
Qty: 15 TABLET | Refills: 0 | Status: SHIPPED | OUTPATIENT
Start: 2025-01-08 | End: 2025-01-13

## 2025-01-08 NOTE — TELEPHONE ENCOUNTER
Patient calling with additional questions after receiving her testing results and my chart message.     Last ov 1/6/25     Patient is asking how long she could be possibly contagious.    Patient is asking if she should continue to use the ointment that was prescribed or is there something else that she should be using.  Patient stated that the current ointment that was prescribed is for external use only.     Patient is still experiencing some coughing and nasal congestion. Patient states that she discontinued the doxycycline after the visit on 1/6/25 as instructed      Pharmacy - [facility] , [place]

## 2025-01-08 NOTE — PROGRESS NOTES
"What are cold sores?   Cold sores are painful blisters that form on or near the lips and inside the mouth. They are caused by an infection with a virus called \"herpes simplex virus.\" There are 2 types of herpes virus. Type 1 causes most cases of cold sores. Type 2 usually affects the penis or vagina.  Cold sores are also known as \"fever blisters.\"  What are the symptoms of cold sores?   The symptoms depend on whether it is the first time a person is getting cold sores. The first attack of cold sores usually happens during childhood.  The first time a person gets cold sores, the symptoms can include:  ?Painful blisters on the lips, mouth, nose, or throat, which eventually pop and form scabs  ?Mouth and throat pain  ?Swelling in the neck  ?Fever, body aches, and feeling ill  After the first time, pain and blisters can come back, but the other symptoms do not usually happen again. Plus, the symptoms are usually milder and don't last as long.  When symptoms first occur, they last about 12 days. Later, if they come back, they can last 8 days or less. Treatment might be able to shorten how long the symptoms last.  Some people can tell when their cold sores are going to come back. They feel pain, burning, tingling, or itching on their lips about a day before blisters form.  People sometimes confuse cold sores with canker sores. But canker sores are different. Canker sores are painful red or white sores, but they do not usually form blisters or scab over. Canker sores can form in your mouth and on your tongue. Doctors do not know what causes canker sores.  Why did I get a cold sore?   The virus that causes cold sores spreads easily from person to person. You might have caught it from an infected person if you drank from the same glass, shared eating utensils, kissed, or had some other type of close contact.  People who give oral sex to people with genital herpes can get cold sores on their mouth.  Should I see a doctor or " "nurse?   Talk to your doctor or nurse if you have symptoms. They might want to see you if your symptoms are severe or if they keep you from swallowing fluids.  Your doctor can also prescribe medicines to help with symptoms or fight the virus.  Is there a test for cold sores?   Yes. Your doctor or nurse can take some fluid from a cold sore and check it for the herpes virus. But that is not usually necessary. In most cases, doctors can diagnose cold sores just by looking at them.  How are cold sores treated?   Most people need treatment the first time they have cold sores. But people who have had cold sores before or who have mild symptoms do not always need treatment. For those who do need treatment, it can come in 2 forms:  ?Some treatments help the body fight the herpes virus. These are called \"antiviral medicines.\" They reduce symptoms and shorten the amount of time symptoms last. These medicines work best if they are started as soon as possible after symptoms begin.  ?Some treatments treat just the pain and discomfort of cold sores without affecting the virus. These include pain-relieving pills and gels that go on the mouth. Many of these treatments are sold without a prescription.  Treatments can help ease the symptoms of cold sores, but no treatment can cure cold sores for good. Once you have the virus that causes cold sores, you will have it for the rest of your life. That means that cold sores can keep coming back. Luckily, symptoms usually get milder with time.  Is there anything I can do on my own to feel better?   Some people find that it helps to:  ?Hold something cold on the area, like a cold, wet towel  ?Suck on ice or ice pops  Can cold sores be prevented?   If you have frequent, painful outbreaks of cold sores, your doctor or nurse might give you an antiviral medicine to take every day to prevent new symptoms. But most people do not need this kind of treatment.  Some people find that they are more " "likely to get cold sores after spending time in the sun. Wearing sunscreen can help prevent this.  To help lower the chance of giving herpes to other people, when you have cold sores, do not:  ?Kiss anyone  ?Share utensils, glasses, water bottles, towels, lip balm, or razors  ?Give anyone oral sex  If you get cold sores, it is possible to spread herpes to others even when you don't have an \"active\" sore that you can see. But you are more likely to spread the infection when you do have an active sore.  When should I call the doctor?   Call your doctor or nurse for advice if:  ?You have a fever or chills along with cold sores.  ?You have signs of infection around your cold sore, like swelling, warmth, spreading redness, or discharge.  ?Your cold sores make it hard for you to talk, eat, or swallow.  ?Your cold sores are not getting better after 10 days.  ?You get cold sores often.  "

## 2025-01-20 DIAGNOSIS — N92.1 MENORRHAGIA WITH IRREGULAR CYCLE: ICD-10-CM

## 2025-01-24 DIAGNOSIS — J06.9 UPPER RESPIRATORY TRACT INFECTION, UNSPECIFIED TYPE: ICD-10-CM

## 2025-01-24 RX ORDER — FLUTICASONE PROPIONATE 50 MCG
SPRAY, SUSPENSION (ML) NASAL
Qty: 48 ML | Refills: 2 | Status: SHIPPED | OUTPATIENT
Start: 2025-01-24

## 2025-01-24 NOTE — TELEPHONE ENCOUNTER
"Refill Request      Last OV with PCP 1/6/2025       Lab Results   Component Value Date    HGBA1C 5.1 03/31/2023     Lab Results   Component Value Date    CHOL 240 (H) 03/18/2024    CHOL 217 (H) 03/31/2023    CHOL 245 (H) 04/21/2022     Lab Results   Component Value Date    HDL 48.5 03/18/2024    HDL 47.2 03/31/2023    HDL 52.0 04/21/2022     Lab Results   Component Value Date    LDLCALC 154 (H) 03/18/2024     Lab Results   Component Value Date    TRIG 189 (H) 03/18/2024    TRIG 201 (H) 03/31/2023    TRIG 155 (H) 04/21/2022     No components found for: \"CHOLHDL\"  Lab Results   Component Value Date    TSH 1.04 03/18/2024     Lab Results   Component Value Date    GLUCOSE 99 03/18/2024    CALCIUM 9.0 03/18/2024     03/18/2024    K 4.0 03/18/2024    CO2 27 03/18/2024     03/18/2024    BUN 9 03/18/2024    CREATININE 0.69 03/18/2024       "

## 2025-02-05 DIAGNOSIS — N92.1 MENORRHAGIA WITH IRREGULAR CYCLE: ICD-10-CM

## 2025-02-05 DIAGNOSIS — Z30.9 ENCOUNTER FOR CONTRACEPTIVE MANAGEMENT, UNSPECIFIED TYPE: Primary | ICD-10-CM

## 2025-02-05 RX ORDER — DROSPIRENONE AND ETHINYL ESTRADIOL 0.02-3(28)
1 KIT ORAL DAILY
Qty: 84 TABLET | Refills: 3 | Status: SHIPPED | OUTPATIENT
Start: 2025-02-05 | End: 2026-02-05

## 2025-02-07 DIAGNOSIS — J06.9 UPPER RESPIRATORY TRACT INFECTION, UNSPECIFIED TYPE: ICD-10-CM

## 2025-02-08 DIAGNOSIS — J06.9 UPPER RESPIRATORY TRACT INFECTION, UNSPECIFIED TYPE: ICD-10-CM

## 2025-02-08 RX ORDER — FLUTICASONE PROPIONATE 50 MCG
2 SPRAY, SUSPENSION (ML) NASAL DAILY
Qty: 144 ML | Refills: 1 | Status: SHIPPED | OUTPATIENT
Start: 2025-02-08

## 2025-02-08 RX ORDER — FLUTICASONE PROPIONATE 50 MCG
SPRAY, SUSPENSION (ML) NASAL
Qty: 144 ML | Refills: 1 | Status: SHIPPED | OUTPATIENT
Start: 2025-02-08 | End: 2025-02-08

## 2025-02-21 RX ORDER — DROSPIRENONE AND ETHINYL ESTRADIOL 0.03MG-3MG
1 KIT ORAL DAILY
Qty: 28 TABLET | Refills: 12 | Status: SHIPPED | OUTPATIENT
Start: 2025-02-21 | End: 2026-02-21

## 2025-03-14 ENCOUNTER — TELEPHONE (OUTPATIENT)
Dept: PRIMARY CARE | Facility: CLINIC | Age: 30
End: 2025-03-14
Payer: COMMERCIAL

## 2025-03-14 DIAGNOSIS — B00.1 RECURRENT COLD SORES: Primary | ICD-10-CM

## 2025-03-14 RX ORDER — ACYCLOVIR 400 MG/1
400 TABLET ORAL
Qty: 35 TABLET | Refills: 3 | Status: SHIPPED | OUTPATIENT
Start: 2025-03-14 | End: 2025-03-21

## 2025-03-14 NOTE — TELEPHONE ENCOUNTER
Patient has a cold sore and says last time she had one it spread all over her mouth and tongue. Wants to know if she can have a prescription for acyclovir sent over to pharmacy on file. I did not see it on her current list so did not no if it was okay to format.

## 2025-03-27 ENCOUNTER — CLINICAL SUPPORT (OUTPATIENT)
Dept: PRIMARY CARE | Facility: CLINIC | Age: 30
End: 2025-03-27
Payer: COMMERCIAL

## 2025-03-27 DIAGNOSIS — E53.8 VITAMIN B12 DEFICIENCY: ICD-10-CM

## 2025-03-27 PROCEDURE — 96372 THER/PROPH/DIAG INJ SC/IM: CPT | Performed by: INTERNAL MEDICINE

## 2025-03-27 RX ORDER — CYANOCOBALAMIN 1000 UG/ML
1000 INJECTION, SOLUTION INTRAMUSCULAR; SUBCUTANEOUS ONCE
Status: COMPLETED | OUTPATIENT
Start: 2025-03-27 | End: 2025-03-27

## 2025-03-27 RX ORDER — CYANOCOBALAMIN 1000 UG/ML
1000 INJECTION, SOLUTION INTRAMUSCULAR; SUBCUTANEOUS ONCE
Status: CANCELLED | OUTPATIENT
Start: 2025-03-27 | End: 2025-03-27

## 2025-03-27 RX ADMIN — CYANOCOBALAMIN 1000 MCG: 1000 INJECTION, SOLUTION INTRAMUSCULAR; SUBCUTANEOUS at 10:14

## 2025-04-03 DIAGNOSIS — N89.8 VAGINAL ODOR: ICD-10-CM

## 2025-04-03 RX ORDER — METRONIDAZOLE 7.5 MG/G
GEL VAGINAL 2 TIMES DAILY
Qty: 70 G | Refills: 1 | Status: SHIPPED | OUTPATIENT
Start: 2025-04-03 | End: 2025-04-08

## 2025-04-03 NOTE — TELEPHONE ENCOUNTER
"Refill Request      Last OV with PCP 1/6/2025     Future Appointments       Date / Time Provider Department Dept Phone    9/8/2025 8:00 AM (Arrive by 7:45 AM) Edith Norton MD [facility] [phone]          Lab Results   Component Value Date    HGBA1C 5.1 03/31/2023     Lab Results   Component Value Date    CHOL 240 (H) 03/18/2024    CHOL 217 (H) 03/31/2023    CHOL 245 (H) 04/21/2022     Lab Results   Component Value Date    HDL 48.5 03/18/2024    HDL 47.2 03/31/2023    HDL 52.0 04/21/2022     Lab Results   Component Value Date    LDLCALC 154 (H) 03/18/2024     Lab Results   Component Value Date    TRIG 189 (H) 03/18/2024    TRIG 201 (H) 03/31/2023    TRIG 155 (H) 04/21/2022     No components found for: \"CHOLHDL\"  Lab Results   Component Value Date    TSH 1.04 03/18/2024     Lab Results   Component Value Date    GLUCOSE 99 03/18/2024    CALCIUM 9.0 03/18/2024     03/18/2024    K 4.0 03/18/2024    CO2 27 03/18/2024     03/18/2024    BUN 9 03/18/2024    CREATININE 0.69 03/18/2024       "

## 2025-06-05 ENCOUNTER — OFFICE VISIT (OUTPATIENT)
Dept: PRIMARY CARE | Facility: CLINIC | Age: 30
End: 2025-06-05
Payer: COMMERCIAL

## 2025-06-05 VITALS
HEART RATE: 82 BPM | WEIGHT: 154 LBS | DIASTOLIC BLOOD PRESSURE: 75 MMHG | SYSTOLIC BLOOD PRESSURE: 111 MMHG | BODY MASS INDEX: 25.66 KG/M2 | HEIGHT: 65 IN | TEMPERATURE: 97.9 F | OXYGEN SATURATION: 98 %

## 2025-06-05 DIAGNOSIS — F60.5 OBSESSIVE-COMPULSIVE PERSONALITY DISORDER (MULTI): ICD-10-CM

## 2025-06-05 DIAGNOSIS — J04.0 LARYNGITIS: ICD-10-CM

## 2025-06-05 DIAGNOSIS — E55.9 VITAMIN D DEFICIENCY: Primary | ICD-10-CM

## 2025-06-05 PROBLEM — E66.3 OVERWEIGHT WITH BODY MASS INDEX (BMI) OF 26 TO 26.9 IN ADULT: Status: ACTIVE | Noted: 2023-04-06

## 2025-06-05 PROCEDURE — 99213 OFFICE O/P EST LOW 20 MIN: CPT | Performed by: NURSE PRACTITIONER

## 2025-06-05 PROCEDURE — 3008F BODY MASS INDEX DOCD: CPT | Performed by: NURSE PRACTITIONER

## 2025-06-05 PROCEDURE — 1036F TOBACCO NON-USER: CPT | Performed by: NURSE PRACTITIONER

## 2025-06-05 RX ORDER — AZITHROMYCIN 250 MG/1
TABLET, FILM COATED ORAL
Qty: 6 TABLET | Refills: 0 | Status: SHIPPED | OUTPATIENT
Start: 2025-06-05 | End: 2025-06-10

## 2025-06-05 RX ORDER — CETIRIZINE HYDROCHLORIDE 10 MG/1
10 TABLET ORAL DAILY
COMMUNITY

## 2025-06-05 RX ORDER — IBUPROFEN 200 MG
200 TABLET ORAL EVERY 6 HOURS
COMMUNITY

## 2025-06-05 RX ORDER — IBUPROFEN 100 MG/5ML
1000 SUSPENSION, ORAL (FINAL DOSE FORM) ORAL 2 TIMES DAILY
COMMUNITY

## 2025-06-05 RX ORDER — TRETINOIN 1 MG/G
CREAM TOPICAL
COMMUNITY
Start: 2025-04-23

## 2025-06-05 RX ORDER — VALACYCLOVIR HYDROCHLORIDE 500 MG/1
TABLET, FILM COATED ORAL
COMMUNITY
Start: 2025-04-09

## 2025-06-05 ASSESSMENT — PATIENT HEALTH QUESTIONNAIRE - PHQ9
2. FEELING DOWN, DEPRESSED OR HOPELESS: NOT AT ALL
SUM OF ALL RESPONSES TO PHQ9 QUESTIONS 1 AND 2: 0
1. LITTLE INTEREST OR PLEASURE IN DOING THINGS: NOT AT ALL

## 2025-06-05 NOTE — ASSESSMENT & PLAN NOTE
Just started 96006sm weekly a couple months ago  Will repeat level as I suspect she may not need such a high dose if she is compliant with daily dosing

## 2025-06-05 NOTE — PROGRESS NOTES
"Problem List Items Addressed This Visit          Medium    Obsessive-compulsive personality disorder (Multi)    Overview   On Celexa   Sees Ashley Hathaway;         Vitamin D deficiency - Primary    Overview   2007: 29  4/22: 14  4/23: 11, started 50K daily  Goal ~50         Current Assessment & Plan   Just started 16121ex weekly a couple months ago  Will repeat level as I suspect she may not need such a high dose if she is compliant with daily dosing         Relevant Orders    Vitamin D 25-Hydroxy,Total (for eval of Vitamin D levels)     Other Visit Diagnoses         Laryngitis        likely viral  cont conservative symptomatic care for now  - ELISEO provided  wait & see azith sent  prn fu IO  monitor single nonfixed L ant cerv lymph    Relevant Medications    azithromycin (Zithromax) 250 mg tablet             Subjective   Patient ID: Arianna Suarez is a 29 y.o. female who presents for Sick Visit (5/31 started not feeling well, felt off/6/1-6/3  sore throat /6/3 stuffy nose /Sore throat has subsided /6/4 lost voice /Tried OTC mucinex -[facility] pharmacist advised her to take Zyrtec and ibuprofen /Zyrtec 10 pm last night /Ibuprofen last dose 9:30 am /Took Valacyclovir last night - prone to cold sores /Has dbl'd Vit C 2 x's 1000 mg ).  HPI  Sx onset x 6 days  Feverish/chills x 1 day  Breathing fine  Cough occ started today  No dysphagia  No ear pain  No rash  No abd pain    Review of Systems   All other systems reviewed and are negative.      BP Readings from Last 3 Encounters:   06/05/25 111/75   01/06/25 116/81   01/02/25 121/84      Wt Readings from Last 3 Encounters:   06/05/25 69.9 kg (154 lb)   01/06/25 75.5 kg (166 lb 6.4 oz)   01/02/25 75.8 kg (167 lb)      BMI:   Estimated body mass index is 26.03 kg/m² as calculated from the following:    Height as of this encounter: 1.638 m (5' 4.5\").    Weight as of this encounter: 69.9 kg (154 lb).    Objective   Physical Exam  Constitutional:       General: She is not in acute " distress.  HENT:      Head: Normocephalic and atraumatic.      Right Ear: Tympanic membrane and external ear normal.      Left Ear: Tympanic membrane and external ear normal.      Nose: Nose normal.      Mouth/Throat:      Mouth: Mucous membranes are moist.   Eyes:      Extraocular Movements: Extraocular movements intact.      Conjunctiva/sclera: Conjunctivae normal.   Neck:      Vascular: No carotid bruit.      Comments: Single non fixed pea sized anterior cerv lymph on the L   Cardiovascular:      Rate and Rhythm: Normal rate and regular rhythm.      Pulses: Normal pulses.   Pulmonary:      Effort: Pulmonary effort is normal.      Breath sounds: Normal breath sounds.   Musculoskeletal:         General: Normal range of motion.      Cervical back: Normal range of motion and neck supple.   Skin:     General: Skin is warm and dry.   Neurological:      General: No focal deficit present.      Mental Status: She is alert.   Psychiatric:         Mood and Affect: Mood normal.

## 2025-06-23 ENCOUNTER — PATIENT MESSAGE (OUTPATIENT)
Dept: PRIMARY CARE | Facility: CLINIC | Age: 30
End: 2025-06-23
Payer: COMMERCIAL

## 2025-06-23 DIAGNOSIS — E78.2 HYPERLIPIDEMIA, MIXED: ICD-10-CM

## 2025-06-27 DIAGNOSIS — E53.8 VITAMIN B12 DEFICIENCY: ICD-10-CM

## 2025-07-18 LAB
25(OH)D3+25(OH)D2 SERPL-MCNC: 62 NG/ML (ref 30–100)
ALBUMIN SERPL-MCNC: 4.1 G/DL (ref 3.6–5.1)
ALP SERPL-CCNC: 42 U/L (ref 31–125)
ALT SERPL-CCNC: 16 U/L (ref 6–29)
ANION GAP SERPL CALCULATED.4IONS-SCNC: 8 MMOL/L (CALC) (ref 7–17)
AST SERPL-CCNC: 15 U/L (ref 10–30)
BILIRUB SERPL-MCNC: 0.4 MG/DL (ref 0.2–1.2)
BUN SERPL-MCNC: 18 MG/DL (ref 7–25)
CALCIUM SERPL-MCNC: 9.4 MG/DL (ref 8.6–10.2)
CHLORIDE SERPL-SCNC: 102 MMOL/L (ref 98–110)
CO2 SERPL-SCNC: 24 MMOL/L (ref 20–32)
CREAT SERPL-MCNC: 0.77 MG/DL (ref 0.5–0.97)
EGFRCR SERPLBLD CKD-EPI 2021: 106 ML/MIN/1.73M2
GLUCOSE SERPL-MCNC: 82 MG/DL (ref 65–139)
POTASSIUM SERPL-SCNC: 4.4 MMOL/L (ref 3.5–5.3)
PROT SERPL-MCNC: 6.8 G/DL (ref 6.1–8.1)
SODIUM SERPL-SCNC: 134 MMOL/L (ref 135–146)

## 2025-07-19 LAB — VIT B12 SERPL-MCNC: 284 PG/ML (ref 200–1100)

## 2025-07-27 ENCOUNTER — PATIENT MESSAGE (OUTPATIENT)
Dept: PRIMARY CARE | Facility: CLINIC | Age: 30
End: 2025-07-27
Payer: COMMERCIAL

## 2025-08-15 ENCOUNTER — OFFICE VISIT (OUTPATIENT)
Dept: PRIMARY CARE | Facility: CLINIC | Age: 30
End: 2025-08-15
Payer: COMMERCIAL

## 2025-08-15 VITALS
HEIGHT: 65 IN | SYSTOLIC BLOOD PRESSURE: 105 MMHG | DIASTOLIC BLOOD PRESSURE: 73 MMHG | WEIGHT: 145 LBS | BODY MASS INDEX: 24.16 KG/M2 | HEART RATE: 81 BPM | OXYGEN SATURATION: 99 % | TEMPERATURE: 97.3 F

## 2025-08-15 DIAGNOSIS — E53.8 VITAMIN B12 DEFICIENCY: Primary | ICD-10-CM

## 2025-08-15 DIAGNOSIS — R59.0 LEFT CERVICAL LYMPHADENOPATHY: ICD-10-CM

## 2025-08-15 RX ORDER — KETOCONAZOLE 20 MG/ML
SHAMPOO, SUSPENSION TOPICAL
COMMUNITY
Start: 2024-09-03

## 2025-08-15 RX ORDER — CYANOCOBALAMIN 1000 UG/ML
1000 INJECTION, SOLUTION INTRAMUSCULAR; SUBCUTANEOUS
Status: SHIPPED | OUTPATIENT
Start: 2025-08-15 | End: 2026-02-11

## 2025-08-15 RX ADMIN — CYANOCOBALAMIN 1000 MCG: 1000 INJECTION, SOLUTION INTRAMUSCULAR; SUBCUTANEOUS at 09:48

## 2025-08-15 ASSESSMENT — PAIN SCALES - GENERAL: PAINLEVEL_OUTOF10: 0-NO PAIN

## 2025-08-16 PROBLEM — E61.1 IRON DEFICIENCY: Status: ACTIVE | Noted: 2025-08-16

## 2025-09-08 ENCOUNTER — APPOINTMENT (OUTPATIENT)
Dept: PRIMARY CARE | Facility: CLINIC | Age: 30
End: 2025-09-08
Payer: COMMERCIAL